# Patient Record
Sex: FEMALE | Race: WHITE | HISPANIC OR LATINO | Employment: FULL TIME | URBAN - METROPOLITAN AREA
[De-identification: names, ages, dates, MRNs, and addresses within clinical notes are randomized per-mention and may not be internally consistent; named-entity substitution may affect disease eponyms.]

---

## 2017-04-08 ENCOUNTER — GENERIC CONVERSION - ENCOUNTER (OUTPATIENT)
Dept: OTHER | Facility: OTHER | Age: 37
End: 2017-04-08

## 2017-11-24 ENCOUNTER — GENERIC CONVERSION - ENCOUNTER (OUTPATIENT)
Dept: OTHER | Facility: OTHER | Age: 37
End: 2017-11-24

## 2018-01-10 NOTE — MISCELLANEOUS
Message  GI Reminder Recall Dexter Treviño:   Date: 04/08/2017   Dear Sandeep Marcus:     Review of our records shows you are due for the following: EGD  Please call the following office to schedule your appointment:   [address] [facility], [facility] [address] [place] [address], [address] [place] [address]  [phone]  We look forward to hearing from you!      Sincerely,       Dr Sofi Leal   Electronically signed by : Farhad Nieves, ; Apr 8 2017  9:09AM EST                       (Author)

## 2018-01-13 NOTE — RESULT NOTES
Message   I discussed labs w/ pt, please mail her a copy     Verified Results  (1) CBC/PLT/DIFF 15Apr2016 10:35AM Maryjo Rodas     Test Name Result Flag Reference   WBC 5 2 x10E3/uL  3 4-10 8   RBC 4 03 x10E6/uL  3 77-5 28   Elliptocytes present  Few schistocytes  Hemoglobin 6 3 g/dL LL 11 1-15 9   Hematocrit 23 3 % L 34 0-46  6   MCV 58 fL L 79-97   MCH 15 6 pg L 26 6-33 0   MCHC 27 0 g/dL L 31 5-35 7   RDW 20 7 % H 12 3-15 4   Platelets 794 N22L3/ZH H 150-379   Neutrophils 58 %     Lymphs 29 %     Monocytes 10 %     Eos 2 %     Basos 1 %     Neutrophils (Absolute) 3 0 x10E3/uL  1 4-7 0   Lymphs (Absolute) 1 5 x10E3/uL  0 7-3 1   Monocytes(Absolute) 0 5 x10E3/uL  0 1-0 9   Eos (Absolute) 0 1 x10E3/uL  0 0-0 4   Baso (Absolute) 0 1 x10E3/uL  0 0-0 2   Immature Granulocytes 0 %     Immature Grans (Abs) 0 0 x10E3/uL  0 0-0 1   Hematology Comments: Note:     Verified by microscopic examination       (1) COMPREHENSIVE METABOLIC PANEL [id number] 35:80MU Maryjo Rodas     Test Name Result Flag Reference   Glucose, Serum 91 mg/dL  65-99   BUN 15 mg/dL  6-20   Creatinine, Serum 0 59 mg/dL  0 57-1 00   eGFR If NonAfricn Am 118 mL/min/1 73  >59   eGFR If Africn Am 136 mL/min/1 73  >59   BUN/Creatinine Ratio 25 H 8-20   Sodium, Serum 138 mmol/L  134-144   Potassium, Serum 4 1 mmol/L  3 5-5 2   Chloride, Serum 101 mmol/L     Carbon Dioxide, Total 21 mmol/L  18-29   Calcium, Serum 9 1 mg/dL  8 7-10 2   Protein, Total, Serum 7 6 g/dL  6 0-8 5   Albumin, Serum 4 2 g/dL  3 5-5 5   Globulin, Total 3 4 g/dL  1 5-4 5   A/G Ratio 1 2  1 1-2 5   Bilirubin, Total 0 2 mg/dL  0 0-1 2   Alkaline Phosphatase, S 79 IU/L     AST (SGOT) 14 IU/L  0-40   ALT (SGPT) 9 IU/L  0-32     (1) VITAMIN D 25-HYDROXY [id number] 10:35AM Maryjo Rodas     Test Name Result Flag Reference   Vitamin D, 25-Hydroxy 15 9 ng/mL L 30 0-100 0   Vitamin D deficiency has been defined by the [place] of  Medicine and an [place] 12 practice guideline as a  level of serum 25-OH vitamin D less than 20 ng/mL (1,2)  The Endocrine Society went on to further define vitamin D  insufficiency as a level between 21 and 29 ng/mL (2)  1  IOM ([place] of Medicine)  2010  Dietary reference     intakes for calcium and D  [facility]: The     [place]ian[place]in  2  Micheline MF, Jerad NC, Luis A HOLLINGSWORTH, et al      Evaluation, treatment, and prevention of vitamin D     deficiency: an Endocrine Society clinical practice     guideline  JCEM  2011 Jul; 96(7):1911-30  (1) HEMOGLOBIN A1C [id number] 10:35AM Maryjo Rodas     Test Name Result Flag Reference   Hemoglobin A1c 5 9 % H 4 8-5 6   Pre-diabetes: 5 7 - 6 4           Diabetes: >6 4           Glycemic control for adults with diabetes: <7 0     [facility]) Cardiovascular Risk Assessment 15Apr2016 10:35AM Maryjo Rodas     Test Name Result Flag Reference   Interpretation Note     Supplement report is available  PDF Image   (LC) LP 15Apr2016 10:35AM Maryjo Rodas     Test Name Result Flag Reference   Cholesterol, Total 149 mg/dL  100-199   Triglycerides 46 mg/dL  0-149   HDL Cholesterol 77 mg/dL  >39   According to ATP-III Guidelines, HDL-C >59 mg/dL is considered a  negative risk factor for CHD     VLDL Cholesterol Bassam 9 mg/dL  5-40   LDL Cholesterol Calc 63 mg/dL  0-99     (LC) Lyme Ab/Western Blot Reflex 15Apr2016 10:35AM Maryjo Rodas     Test Name Result Flag Reference   Lyme IgG/IgM Ab <0 91 ISR  0 00-0 90   Negative         <0 91                                                 Equivocal  0 91 - 1 09                                                 Positive         >1 09   Lyme Disease Ab, Quant, IgM <0 80 index  0 00-0 79   Negative         <0 80                                                 Equivocal  0 80 - 1 19                                                 Positive         >1 19                  IgM levels may peak at 3-6 weeks post infection, then gradually decline       (LC) TSH Rfx on Abnormal to Free T4 15Apr2016 10:35AM Maryjo Rodas     Test Name Result Flag Reference   TSH 1 270 uIU/mL  0 450-4 500

## 2018-01-14 NOTE — RESULT NOTES
Message    Gastric biopsies are negative for h pylori  Patient to continue protonix and carafate  1    EGD in 3 months  LMOM for pt to call the office for results/reminder set  Thanks CF1      Patient aware  lc    Please send message to patient; inform her she had fungal infection noted on her EGD and needs to take Diflucan for 2 weeks; I sent Rx to her pharmacy  Also remind her to follow up for EGD to confirm gastric ulcer healing  I've attempted calling her numerous times over the last couple of weeks but gotten no answer  2       1 Amended By: Wendy Brink; Apr 26 2016 8:30 AM EST   2 Amended By: Makeda Green; Apr 27 2016 9:01 AM EST    Verified Results  (1) FUNGUS - YEAST CULTURE1 19Apr2016 04:51PM1 Jane Martinez   In saline     Test Name Result Flag Reference   CLINICAL REPORT1 (Report)1     Test:        Fungal culture  Specimen Source:  Esophagus  Specimen Type:   Tissue  Specimen Date:   4/19/2016 4:51 PM  Result Date:    4/25/2016 8:37 AM  Result Status:   Final result  Resulting Lab:    [place] [address]            Tel: [phone]                 CULTURE                                       ------------------                                   3+ Growth of Candida albicans     *** This organism has been edited   The previous result was Yeast on 4/22/2016      at 1339 EDT  ***     (1) TISSUE EXAM 19Apr2016 04:48PM Jane Martinez     Test Name Result Flag Reference   LAB AP CASE REPORT (Report)     Surgical Pathology Report             Case: [id number]                   Authorizing Provider: Dipti Howe PA-C   Collected:      04/19/2016 1648        Ordering Location:   [facility]  Received:      04/19/2016 [address] [place] [address]                                     Pathologist:      Jessy Henderson MD                                Specimen:  Stomach, Gastric body, check H  pylori   LAB AP FINAL DIAGNOSIS (Report)     A  Stomach, Gastric body, check H  pylori, biopsy:   - Oxyntic gastric mucosa with mild chronic inactive gastritis  - Negative for curvilinear helicobacter organisms, confirmed by   immunostain with appropriate controls  - No glandular atrophy or intestinal metaplasia identified    - No glandular dysplasia and no evidence of malignancy  Interpretation performed at [facility], [address] [place] [address]  LAB AP SURGICAL ADDITIONAL INFORMATION (Report)     These tests were developed and their performance characteristics   determined by [address] [place] [address] or [facility]  They may not be cleared or approved by the [place]  Food and   Drug Administration  The FDA has determined that such clearance or   approval is not necessary  These tests are used for clinical purposes  They should not be regarded as investigational or for research  This   laboratory has been approved by CLIA 88, designated as a high-complexity   laboratory and is qualified to perform these tests  LAB AP GROSS DESCRIPTION (Report)     A  The specimen is received in formalin, labeled with the patient's name   and hospital number, and is designated gastric body, check H  pylori  The specimen consists of 3 rubbery and friable tan tissue fragments   measuring from 0 1 up to 0 5 cm in greatest dimension  Entirely submitted  One cassette  Note: The estimated total formalin fixation time based upon information   provided by the submitting clinician and the standard processing schedule   is 21 25 hours  SRS         1  Amended By: Zac Cantu;  May 11 2016 10:43 AM EST

## 2018-01-16 NOTE — MISCELLANEOUS
Message  GI Reminder Recall [facility]:   Date: 11/24/2017   Dear Dustin Alanis:     Review of our records shows you are due for the following: EGD  Or records indicate that you are due at this time to have a follow-up examination for a EGD  As you know, these tests are done to prevent cancer, a very common disease in the [place] and responsible for thousands of patient deaths each year  We at [facility] are concerned for your health, and would very much appreciate you getting in touch with us at your earliest convenience  Again, this examination is vital to your proper health maintenance and for the prevention of cancer  Please call the following office to schedule your appointment:   [address], [address] [phone]  We look forward to hearing from you!      Sincerely,     [facility]      Signatures   Electronically signed by : Agustina Jimenez, ; Nov 24 2017  2:47PM EST                       (Author)

## 2018-02-20 ENCOUNTER — HOSPITAL ENCOUNTER (EMERGENCY)
Facility: HOSPITAL | Age: 38
Discharge: HOME | End: 2018-02-20
Attending: EMERGENCY MEDICINE | Admitting: EMERGENCY MEDICINE
Payer: COMMERCIAL

## 2018-02-20 VITALS
WEIGHT: 153 LBS | HEART RATE: 112 BPM | HEIGHT: 64 IN | SYSTOLIC BLOOD PRESSURE: 108 MMHG | TEMPERATURE: 99.1 F | DIASTOLIC BLOOD PRESSURE: 65 MMHG | RESPIRATION RATE: 20 BRPM | BODY MASS INDEX: 26.12 KG/M2 | OXYGEN SATURATION: 98 %

## 2018-02-20 DIAGNOSIS — J11.1 FLU: Primary | ICD-10-CM

## 2018-02-20 PROCEDURE — 99284 EMERGENCY DEPT VISIT MOD MDM: CPT

## 2018-02-20 PROCEDURE — 87798 DETECT AGENT NOS DNA AMP: CPT | Performed by: EMERGENCY MEDICINE

## 2018-02-20 RX ORDER — GUAIFENESIN/DEXTROMETHORPHAN 100-10MG/5
10 SYRUP ORAL 4 TIMES DAILY PRN
Qty: 118 ML | Refills: 0 | Status: SHIPPED | OUTPATIENT
Start: 2018-02-20 | End: 2019-01-19

## 2018-02-20 RX ORDER — GUAIFENESIN/DEXTROMETHORPHAN 100-10MG/5
10 SYRUP ORAL ONCE
Status: COMPLETED | OUTPATIENT
Start: 2018-02-20 | End: 2018-02-20

## 2018-02-20 RX ORDER — OSELTAMIVIR PHOSPHATE 75 MG/1
75 CAPSULE ORAL ONCE
Status: COMPLETED | OUTPATIENT
Start: 2018-02-20 | End: 2018-02-20

## 2018-02-20 RX ORDER — OSELTAMIVIR PHOSPHATE 75 MG/1
75 CAPSULE ORAL 2 TIMES DAILY
Qty: 10 CAPSULE | Refills: 0 | Status: SHIPPED | OUTPATIENT
Start: 2018-02-20 | End: 2018-02-25

## 2018-02-20 RX ADMIN — OSELTAMIVIR PHOSPHATE 75 MG: 75 CAPSULE ORAL at 21:23

## 2018-02-20 RX ADMIN — GUAIFENESIN AND DEXTROMETHORPHAN 10 ML: 100; 10 SYRUP ORAL at 21:22

## 2018-02-21 LAB
FLUAV AG SPEC QL: DETECTED
FLUBV AG SPEC QL: ABNORMAL
RSV B RNA SPEC QL NAA+PROBE: ABNORMAL

## 2018-02-21 NOTE — DISCHARGE INSTRUCTIONS
Influenza   WHAT YOU NEED TO KNOW:   Influenza (the flu) is an infection caused by the influenza virus  The flu is easily spread when an infected person coughs, sneezes, or has close contact with others  You may be able to spread the flu to others for 1 week or longer after signs or symptoms appear  DISCHARGE INSTRUCTIONS:   Call 911 for any of the following:   · You have trouble breathing, and your lips look purple or blue  · You have a seizure  Return to the emergency department if:   · You are dizzy, or you are urinating less or not at all  · You have a headache with a stiff neck, and you feel tired or confused  · You have new pain or pressure in your chest     · Your symptoms, such as shortness of breath, vomiting, or diarrhea, get worse  · Your symptoms, such as fever and coughing, seem to get better, but then get worse  Contact your healthcare provider if:   · You have new muscle pain or weakness  · You have questions or concerns about your condition or care  Medicines: You may need any of the following:  · Acetaminophen  decreases pain and fever  It is available without a doctor's order  Ask how much to take and how often to take it  Follow directions  Acetaminophen can cause liver damage if not taken correctly  · NSAIDs , such as ibuprofen, help decrease swelling, pain, and fever  This medicine is available with or without a doctor's order  NSAIDs can cause stomach bleeding or kidney problems in certain people  If you take blood thinner medicine, always ask your healthcare provider if NSAIDs are safe for you  Always read the medicine label and follow directions  · Antivirals  help fight a viral infection  · Take your medicine as directed  Contact your healthcare provider if you think your medicine is not helping or if you have side effects  Tell him or her if you are allergic to any medicine  Keep a list of the medicines, vitamins, and herbs you take   Include the amounts, and when and why you take them  Bring the list or the pill bottles to follow-up visits  Carry your medicine list with you in case of an emergency  Rest  as much as you can to help you recover  Drink liquids as directed  to help prevent dehydration  Ask how much liquid to drink each day and which liquids are best for you  Prevent the spread of influenza:   · Wash your hands often  Use soap and water  Wash your hands after you use the bathroom, change a child's diapers, or sneeze  Wash your hands before you prepare or eat food  Use gel hand cleanser when soap and water are not available  Do not touch your eyes, nose, or mouth unless you have washed your hands first            · Cover your mouth when you sneeze or cough  Cough into a tissue or the bend of your arm  · Clean shared items with a germ-killing   Clean table surfaces, doorknobs, and light switches  Do not share towels, silverware, and dishes with people who are sick  Wash bed sheets, towels, silverware, and dishes with soap and water  · Wear a mask  over your mouth and nose if you are sick or are near anyone who is sick  · Stay away from others  if you are sick  · Influenza vaccine  helps prevent influenza (flu)  Everyone older than 6 months should get a yearly influenza vaccine  Get the vaccine as soon as it is available, usually in September or October each year  Follow up with your healthcare provider as directed:  Write down your questions so you remember to ask them during your visits  © 2017 [facility] Information is for End User's use only and may not be sold, redistributed or otherwise used for commercial purposes  All illustrations and images included in CareNotes® are the copyrighted property of [employer]  or Filemon Murdock  The above information is an  only  It is not intended as medical advice for individual conditions or treatments   Talk to your doctor, nurse or pharmacist before following any medical regimen to see if it is safe and effective for you

## 2018-06-23 ENCOUNTER — APPOINTMENT (OUTPATIENT)
Dept: LAB | Facility: HOSPITAL | Age: 38
End: 2018-06-23
Payer: COMMERCIAL

## 2018-06-23 ENCOUNTER — TRANSCRIBE ORDERS (OUTPATIENT)
Dept: ADMINISTRATIVE | Facility: HOSPITAL | Age: 38
End: 2018-06-23

## 2018-06-23 DIAGNOSIS — Z00.8 HEALTH EXAMINATION IN POPULATION SURVEY: ICD-10-CM

## 2018-06-23 DIAGNOSIS — Z00.8 HEALTH EXAMINATION IN POPULATION SURVEY: Primary | ICD-10-CM

## 2018-06-23 LAB
CHOLEST SERPL-MCNC: 167 MG/DL (ref 50–200)
EST. AVERAGE GLUCOSE BLD GHB EST-MCNC: 120 MG/DL
HBA1C MFR BLD: 5.8 % (ref 4.2–6.3)
HDLC SERPL-MCNC: 96 MG/DL (ref 40–60)
LDLC SERPL CALC-MCNC: 62 MG/DL (ref 0–100)
NONHDLC SERPL-MCNC: 71 MG/DL
TRIGL SERPL-MCNC: 45 MG/DL

## 2018-06-23 PROCEDURE — 80061 LIPID PANEL: CPT

## 2018-06-23 PROCEDURE — 83036 HEMOGLOBIN GLYCOSYLATED A1C: CPT

## 2018-06-23 PROCEDURE — 36415 COLL VENOUS BLD VENIPUNCTURE: CPT

## 2018-07-11 ENCOUNTER — APPOINTMENT (OUTPATIENT)
Dept: LAB | Facility: CLINIC | Age: 38
End: 2018-07-11
Payer: COMMERCIAL

## 2018-07-11 ENCOUNTER — OFFICE VISIT (OUTPATIENT)
Dept: FAMILY MEDICINE CLINIC | Facility: CLINIC | Age: 38
End: 2018-07-11
Payer: COMMERCIAL

## 2018-07-11 VITALS
WEIGHT: 143 LBS | RESPIRATION RATE: 16 BRPM | SYSTOLIC BLOOD PRESSURE: 110 MMHG | DIASTOLIC BLOOD PRESSURE: 82 MMHG | HEIGHT: 64 IN | BODY MASS INDEX: 24.41 KG/M2 | HEART RATE: 72 BPM | TEMPERATURE: 97.5 F

## 2018-07-11 DIAGNOSIS — D64.9 ANEMIA, UNSPECIFIED TYPE: ICD-10-CM

## 2018-07-11 DIAGNOSIS — K28.9 ULCER AT SITE OF SURGICAL ANASTOMOSIS FOLLOWING BYPASS OF STOMACH: ICD-10-CM

## 2018-07-11 DIAGNOSIS — T85.898A ULCER AT SITE OF SURGICAL ANASTOMOSIS FOLLOWING BYPASS OF STOMACH: ICD-10-CM

## 2018-07-11 DIAGNOSIS — Z98.84 HISTORY OF GASTRIC BYPASS: ICD-10-CM

## 2018-07-11 DIAGNOSIS — K27.9 PUD (PEPTIC ULCER DISEASE): ICD-10-CM

## 2018-07-11 DIAGNOSIS — R11.2 NAUSEA AND VOMITING, INTRACTABILITY OF VOMITING NOT SPECIFIED, UNSPECIFIED VOMITING TYPE: Primary | ICD-10-CM

## 2018-07-11 DIAGNOSIS — R11.2 NAUSEA AND VOMITING, INTRACTABILITY OF VOMITING NOT SPECIFIED, UNSPECIFIED VOMITING TYPE: ICD-10-CM

## 2018-07-11 LAB
ALBUMIN SERPL BCP-MCNC: 3.7 G/DL (ref 3.5–5)
ALP SERPL-CCNC: 83 U/L (ref 46–116)
ALT SERPL W P-5'-P-CCNC: 20 U/L (ref 12–78)
AMYLASE SERPL-CCNC: 37 IU/L (ref 25–115)
ANION GAP SERPL CALCULATED.3IONS-SCNC: 7 MMOL/L (ref 4–13)
AST SERPL W P-5'-P-CCNC: 17 U/L (ref 5–45)
BASOPHILS # BLD AUTO: 0.07 THOUSANDS/ΜL (ref 0–0.1)
BASOPHILS NFR BLD AUTO: 1 % (ref 0–1)
BILIRUB SERPL-MCNC: 0.4 MG/DL (ref 0.2–1)
BUN SERPL-MCNC: 20 MG/DL (ref 5–25)
CALCIUM SERPL-MCNC: 9.1 MG/DL (ref 8.3–10.1)
CHLORIDE SERPL-SCNC: 99 MMOL/L (ref 100–108)
CO2 SERPL-SCNC: 29 MMOL/L (ref 21–32)
CREAT SERPL-MCNC: 0.71 MG/DL (ref 0.6–1.3)
EOSINOPHIL # BLD AUTO: 0.1 THOUSAND/ΜL (ref 0–0.61)
EOSINOPHIL NFR BLD AUTO: 2 % (ref 0–6)
ERYTHROCYTE [DISTWIDTH] IN BLOOD BY AUTOMATED COUNT: 19.9 % (ref 11.6–15.1)
FERRITIN SERPL-MCNC: 5 NG/ML (ref 8–388)
FOLATE SERPL-MCNC: 18.2 NG/ML (ref 3.1–17.5)
GFR SERPL CREATININE-BSD FRML MDRD: 108 ML/MIN/1.73SQ M
GLUCOSE SERPL-MCNC: 97 MG/DL (ref 65–140)
HCT VFR BLD AUTO: 29.4 % (ref 34.8–46.1)
HGB BLD-MCNC: 8.6 G/DL (ref 11.5–15.4)
IRON SATN MFR SERPL: 5 %
IRON SERPL-MCNC: 24 UG/DL (ref 50–170)
LIPASE SERPL-CCNC: 86 U/L (ref 73–393)
LYMPHOCYTES # BLD AUTO: 1.42 THOUSANDS/ΜL (ref 0.6–4.47)
LYMPHOCYTES NFR BLD AUTO: 25 % (ref 14–44)
MCH RBC QN AUTO: 20.9 PG (ref 26.8–34.3)
MCHC RBC AUTO-ENTMCNC: 29.3 G/DL (ref 31.4–37.4)
MCV RBC AUTO: 72 FL (ref 82–98)
MONOCYTES # BLD AUTO: 0.61 THOUSAND/ΜL (ref 0.17–1.22)
MONOCYTES NFR BLD AUTO: 11 % (ref 4–12)
NEUTROPHILS # BLD AUTO: 3.46 THOUSANDS/ΜL (ref 1.85–7.62)
NEUTS SEG NFR BLD AUTO: 61 % (ref 43–75)
PLATELET # BLD AUTO: 357 THOUSANDS/UL (ref 149–390)
POTASSIUM SERPL-SCNC: 3.5 MMOL/L (ref 3.5–5.3)
PROT SERPL-MCNC: 7.9 G/DL (ref 6.4–8.2)
RBC # BLD AUTO: 4.11 MILLION/UL (ref 3.81–5.12)
SODIUM SERPL-SCNC: 135 MMOL/L (ref 136–145)
TIBC SERPL-MCNC: 450 UG/DL (ref 250–450)
VIT B12 SERPL-MCNC: 467 PG/ML (ref 100–900)
WBC # BLD AUTO: 5.66 THOUSAND/UL (ref 4.31–10.16)

## 2018-07-11 PROCEDURE — 83550 IRON BINDING TEST: CPT

## 2018-07-11 PROCEDURE — 82746 ASSAY OF FOLIC ACID SERUM: CPT

## 2018-07-11 PROCEDURE — 82728 ASSAY OF FERRITIN: CPT

## 2018-07-11 PROCEDURE — 36415 COLL VENOUS BLD VENIPUNCTURE: CPT

## 2018-07-11 PROCEDURE — 83540 ASSAY OF IRON: CPT

## 2018-07-11 PROCEDURE — 82607 VITAMIN B-12: CPT

## 2018-07-11 PROCEDURE — 80053 COMPREHEN METABOLIC PANEL: CPT

## 2018-07-11 PROCEDURE — 99214 OFFICE O/P EST MOD 30 MIN: CPT | Performed by: NURSE PRACTITIONER

## 2018-07-11 PROCEDURE — 82150 ASSAY OF AMYLASE: CPT

## 2018-07-11 PROCEDURE — 83690 ASSAY OF LIPASE: CPT

## 2018-07-11 PROCEDURE — 3008F BODY MASS INDEX DOCD: CPT | Performed by: NURSE PRACTITIONER

## 2018-07-11 PROCEDURE — 85025 COMPLETE CBC W/AUTO DIFF WBC: CPT

## 2018-07-11 PROCEDURE — 1036F TOBACCO NON-USER: CPT | Performed by: NURSE PRACTITIONER

## 2018-07-11 RX ORDER — OMEPRAZOLE 20 MG/1
20 CAPSULE, DELAYED RELEASE ORAL DAILY PRN
COMMUNITY
End: 2019-06-26

## 2018-07-11 RX ORDER — ONDANSETRON 4 MG/1
4 TABLET, FILM COATED ORAL EVERY 8 HOURS PRN
Qty: 20 TABLET | Refills: 0 | Status: SHIPPED | OUTPATIENT
Start: 2018-07-11 | End: 2019-01-19

## 2018-07-11 RX ORDER — PANTOPRAZOLE SODIUM 40 MG/1
40 TABLET, DELAYED RELEASE ORAL DAILY
Qty: 30 TABLET | Refills: 0 | Status: SHIPPED | OUTPATIENT
Start: 2018-07-11 | End: 2019-01-19

## 2018-07-11 RX ORDER — IBUPROFEN 400 MG/1
TABLET ORAL AS NEEDED
COMMUNITY
End: 2019-01-19

## 2018-07-11 NOTE — PATIENT INSTRUCTIONS
Supportive care discussed and advised  Follow up for no improvement and worsening of conditions  Patient advised and educated when to see immediate medical care

## 2018-07-11 NOTE — PROGRESS NOTES
Assessment/Plan:  Will start protonix and zofran and will follow up with GI and if getting worse, will go to ER  Supportive care discussed and advised  Follow up for no improvement and worsening of conditions  Patient advised and educated when to see immediate medical care  Diagnoses and all orders for this visit:    Nausea and vomiting, intractability of vomiting not specified, unspecified vomiting type  -     Comprehensive metabolic panel; Future  -     CBC and differential; Future  -     Iron Panel; Future  -     Ferritin; Future  -     Amylase; Future  -     Lipase; Future  -     Ambulatory referral to Gastroenterology; Future  -     Vitamin B12; Future  -     Folate; Future  -     pantoprazole (PROTONIX) 40 mg tablet; Take 1 tablet (40 mg total) by mouth daily for 30 days  -     ondansetron (ZOFRAN) 4 mg tablet; Take 1 tablet (4 mg total) by mouth every 8 (eight) hours as needed for nausea or vomiting    PUD (peptic ulcer disease)  -     Comprehensive metabolic panel; Future  -     CBC and differential; Future  -     Iron Panel; Future  -     Ferritin; Future  -     Amylase; Future  -     Lipase; Future  -     Ambulatory referral to Gastroenterology; Future  -     Vitamin B12; Future  -     Folate; Future  -     pantoprazole (PROTONIX) 40 mg tablet; Take 1 tablet (40 mg total) by mouth daily for 30 days    Ulcer at site of surgical anastomosis following bypass of stomach  -     Comprehensive metabolic panel; Future  -     CBC and differential; Future  -     Iron Panel; Future  -     Ferritin; Future  -     Amylase; Future  -     Lipase; Future  -     Ambulatory referral to Gastroenterology; Future  -     Vitamin B12; Future  -     Folate; Future  -     pantoprazole (PROTONIX) 40 mg tablet; Take 1 tablet (40 mg total) by mouth daily for 30 days    Anemia, unspecified type  -     Comprehensive metabolic panel; Future  -     CBC and differential; Future  -     Iron Panel;  Future  -     Ferritin; Future  -     Amylase; Future  -     Lipase; Future  -     Vitamin B12; Future  -     Folate; Future    History of gastric bypass    BMI 24 0-24 9, adult    Other orders  -     omeprazole (PriLOSEC) 20 mg delayed release capsule; Take 20 mg by mouth daily as needed  -     ibuprofen (MOTRIN) 400 mg tablet; Take by mouth as needed for mild pain          Return if symptoms worsen or fail to improve  Subjective:      Patient ID: Lizz Reed is a 44 y o  female  Chief Complaint   Patient presents with    GI Problem     prcma       HPI   Had gastric bypass in 2004 and gets on and off dumping syndrome  Not able to keep anything down and vomits 5 mts after she eats and stated that did not taste any stomach acid from a week  Stated that able to manage her dumping syndrome on her own  Stated that never follow up with physicians as advised  Had PUD and had scope done and never went back for repeat one  Currently taking omperazole 20 mg daily  Denies chest pain, sob, headache and dizziness  Drinks alcohol very occasionally  The following portions of the patient's history were reviewed and updated as appropriate: allergies, current medications, past family history, past medical history, past social history, past surgical history and problem list       Review of Systems   Constitutional: Negative  Respiratory: Negative  Cardiovascular: Negative  Gastrointestinal: Positive for nausea and vomiting  Negative for abdominal distention, abdominal pain, anal bleeding, blood in stool, constipation, diarrhea and rectal pain  Genitourinary: Negative  Musculoskeletal: Negative  Neurological: Negative  Objective:    History   Smoking Status    Never Smoker   Smokeless Tobacco    Never Used       Allergies:    Allergies   Allergen Reactions    Penicillins     Percocet [Oxycodone-Acetaminophen]        Vitals:  /82   Pulse 72   Temp 97 5 °F (36 4 °C)   Resp 16   Ht 5' 4" (1 626 m) Wt 64 9 kg (143 lb)   LMP 06/27/2018 (Approximate)   BMI 24 55 kg/m²     Current Outpatient Prescriptions   Medication Sig Dispense Refill    acetaminophen (TYLENOL) 325 mg tablet Take 2 tablets (650 mg total) by mouth every 6 (six) hours as needed for mild pain  30 tablet 0    ibuprofen (MOTRIN) 400 mg tablet Take by mouth as needed for mild pain      omeprazole (PriLOSEC) 20 mg delayed release capsule Take 20 mg by mouth daily as needed      dextromethorphan-guaiFENesin (ROBITUSSIN DM)  mg/5 mL syrup Take 10 mL by mouth 4 (four) times a day as needed for cough 118 mL 0    ondansetron (ZOFRAN) 4 mg tablet Take 1 tablet (4 mg total) by mouth every 8 (eight) hours as needed for nausea or vomiting 20 tablet 0    pantoprazole (PROTONIX) 40 mg tablet Take 1 tablet (40 mg total) by mouth daily for 30 days 30 tablet 0     No current facility-administered medications for this visit  Physical Exam   Constitutional: She is oriented to person, place, and time  She appears well-developed and well-nourished  HENT:   Head: Normocephalic  Right Ear: External ear normal    Left Ear: External ear normal    Nose: Nose normal    Mouth/Throat: Oropharynx is clear and moist    Eyes: Conjunctivae are normal  Pupils are equal, round, and reactive to light  Cardiovascular: Normal rate, regular rhythm and normal heart sounds  Pulmonary/Chest: Effort normal and breath sounds normal    Abdominal: Soft  Normal appearance and bowel sounds are normal  There is no hepatosplenomegaly  There is no tenderness  There is no rebound and no CVA tenderness  Musculoskeletal: Normal range of motion  She exhibits no edema, tenderness or deformity  Neurological: She is alert and oriented to person, place, and time  She has normal strength  No sensory deficit  She displays a negative Romberg sign  GCS eye subscore is 4  GCS verbal subscore is 5  GCS motor subscore is 6  Skin: Skin is warm and dry     Psychiatric: She has a normal mood and affect   Her behavior is normal  Judgment and thought content normal          DNEYS Rabago

## 2019-01-19 ENCOUNTER — APPOINTMENT (EMERGENCY)
Dept: RADIOLOGY | Facility: HOSPITAL | Age: 39
End: 2019-01-19
Payer: COMMERCIAL

## 2019-01-19 ENCOUNTER — HOSPITAL ENCOUNTER (EMERGENCY)
Facility: HOSPITAL | Age: 39
Discharge: HOME | End: 2019-01-19
Attending: EMERGENCY MEDICINE | Admitting: EMERGENCY MEDICINE
Payer: COMMERCIAL

## 2019-01-19 VITALS
TEMPERATURE: 99.1 F | SYSTOLIC BLOOD PRESSURE: 104 MMHG | DIASTOLIC BLOOD PRESSURE: 57 MMHG | HEART RATE: 92 BPM | OXYGEN SATURATION: 98 % | RESPIRATION RATE: 18 BRPM

## 2019-01-19 DIAGNOSIS — D64.9 SYMPTOMATIC ANEMIA: Primary | ICD-10-CM

## 2019-01-19 LAB
ABO GROUP BLD: NORMAL
ALBUMIN SERPL BCP-MCNC: 3.4 G/DL (ref 3.5–5)
ALP SERPL-CCNC: 55 U/L (ref 46–116)
ALT SERPL W P-5'-P-CCNC: 18 U/L (ref 12–78)
ANION GAP SERPL CALCULATED.3IONS-SCNC: 9 MMOL/L (ref 4–13)
AST SERPL W P-5'-P-CCNC: 65 U/L (ref 5–45)
BASOPHILS # BLD AUTO: 0.05 THOUSANDS/ΜL (ref 0–0.1)
BASOPHILS NFR BLD AUTO: 1 % (ref 0–1)
BILIRUB SERPL-MCNC: 0.3 MG/DL (ref 0.2–1)
BILIRUB UR QL STRIP: NEGATIVE
BLD GP AB SCN SERPL QL: NEGATIVE
BUN SERPL-MCNC: 19 MG/DL (ref 5–25)
CALCIUM SERPL-MCNC: 8.9 MG/DL (ref 8.3–10.1)
CHLORIDE SERPL-SCNC: 99 MMOL/L (ref 100–108)
CLARITY UR: CLEAR
CO2 SERPL-SCNC: 27 MMOL/L (ref 21–32)
COLOR UR: YELLOW
CREAT SERPL-MCNC: 0.6 MG/DL (ref 0.6–1.3)
EOSINOPHIL # BLD AUTO: 0.15 THOUSAND/ΜL (ref 0–0.61)
EOSINOPHIL NFR BLD AUTO: 2 % (ref 0–6)
ERYTHROCYTE [DISTWIDTH] IN BLOOD BY AUTOMATED COUNT: 18.7 % (ref 11.6–15.1)
EXT PREG TEST URINE: NEGATIVE
GFR SERPL CREATININE-BSD FRML MDRD: 115 ML/MIN/1.73SQ M
GLUCOSE SERPL-MCNC: 79 MG/DL (ref 65–140)
GLUCOSE UR STRIP-MCNC: NEGATIVE MG/DL
HCT VFR BLD AUTO: 25 % (ref 34.8–46.1)
HGB BLD-MCNC: 7 G/DL (ref 11.5–15.4)
HGB UR QL STRIP.AUTO: NEGATIVE
IMM GRANULOCYTES # BLD AUTO: 0.02 THOUSAND/UL (ref 0–0.2)
IMM GRANULOCYTES NFR BLD AUTO: 0 % (ref 0–2)
KETONES UR STRIP-MCNC: NEGATIVE MG/DL
LEUKOCYTE ESTERASE UR QL STRIP: NEGATIVE
LYMPHOCYTES # BLD AUTO: 2.19 THOUSANDS/ΜL (ref 0.6–4.47)
LYMPHOCYTES NFR BLD AUTO: 34 % (ref 14–44)
MCH RBC QN AUTO: 19.6 PG (ref 26.8–34.3)
MCHC RBC AUTO-ENTMCNC: 28 G/DL (ref 31.4–37.4)
MCV RBC AUTO: 70 FL (ref 82–98)
MONOCYTES # BLD AUTO: 0.58 THOUSAND/ΜL (ref 0.17–1.22)
MONOCYTES NFR BLD AUTO: 9 % (ref 4–12)
NEUTROPHILS # BLD AUTO: 3.4 THOUSANDS/ΜL (ref 1.85–7.62)
NEUTS SEG NFR BLD AUTO: 54 % (ref 43–75)
NITRITE UR QL STRIP: NEGATIVE
NRBC BLD AUTO-RTO: 0 /100 WBCS
PH UR STRIP.AUTO: 6 [PH] (ref 5–9)
PLATELET # BLD AUTO: 379 THOUSANDS/UL (ref 149–390)
POTASSIUM SERPL-SCNC: 4.2 MMOL/L (ref 3.5–5.3)
PROT SERPL-MCNC: 7.5 G/DL (ref 6.4–8.2)
PROT UR STRIP-MCNC: NEGATIVE MG/DL
RBC # BLD AUTO: 3.57 MILLION/UL (ref 3.81–5.12)
RH BLD: POSITIVE
SODIUM SERPL-SCNC: 135 MMOL/L (ref 136–145)
SP GR UR STRIP.AUTO: 1.02 (ref 1–1.03)
SPECIMEN EXPIRATION DATE: NORMAL
TROPONIN I SERPL-MCNC: <0.02 NG/ML
TSH SERPL DL<=0.05 MIU/L-ACNC: 1.19 UIU/ML (ref 0.36–3.74)
UROBILINOGEN UR QL STRIP.AUTO: 0.2 E.U./DL
WBC # BLD AUTO: 6.39 THOUSAND/UL (ref 4.31–10.16)

## 2019-01-19 PROCEDURE — 71045 X-RAY EXAM CHEST 1 VIEW: CPT

## 2019-01-19 PROCEDURE — 93005 ELECTROCARDIOGRAM TRACING: CPT

## 2019-01-19 PROCEDURE — 80053 COMPREHEN METABOLIC PANEL: CPT | Performed by: EMERGENCY MEDICINE

## 2019-01-19 PROCEDURE — 99284 EMERGENCY DEPT VISIT MOD MDM: CPT

## 2019-01-19 PROCEDURE — 36430 TRANSFUSION BLD/BLD COMPNT: CPT

## 2019-01-19 PROCEDURE — 84443 ASSAY THYROID STIM HORMONE: CPT | Performed by: EMERGENCY MEDICINE

## 2019-01-19 PROCEDURE — 96360 HYDRATION IV INFUSION INIT: CPT

## 2019-01-19 PROCEDURE — 86901 BLOOD TYPING SEROLOGIC RH(D): CPT | Performed by: EMERGENCY MEDICINE

## 2019-01-19 PROCEDURE — P9016 RBC LEUKOCYTES REDUCED: HCPCS

## 2019-01-19 PROCEDURE — 84484 ASSAY OF TROPONIN QUANT: CPT | Performed by: EMERGENCY MEDICINE

## 2019-01-19 PROCEDURE — 36415 COLL VENOUS BLD VENIPUNCTURE: CPT | Performed by: EMERGENCY MEDICINE

## 2019-01-19 PROCEDURE — 81003 URINALYSIS AUTO W/O SCOPE: CPT | Performed by: EMERGENCY MEDICINE

## 2019-01-19 PROCEDURE — 86900 BLOOD TYPING SEROLOGIC ABO: CPT | Performed by: EMERGENCY MEDICINE

## 2019-01-19 PROCEDURE — 86850 RBC ANTIBODY SCREEN: CPT | Performed by: EMERGENCY MEDICINE

## 2019-01-19 PROCEDURE — 81025 URINE PREGNANCY TEST: CPT | Performed by: EMERGENCY MEDICINE

## 2019-01-19 PROCEDURE — 85025 COMPLETE CBC W/AUTO DIFF WBC: CPT | Performed by: EMERGENCY MEDICINE

## 2019-01-19 PROCEDURE — P9021 RED BLOOD CELLS UNIT: HCPCS

## 2019-01-19 PROCEDURE — 86920 COMPATIBILITY TEST SPIN: CPT

## 2019-01-19 RX ADMIN — SODIUM CHLORIDE 1000 ML: 0.9 INJECTION, SOLUTION INTRAVENOUS at 04:55

## 2019-01-19 NOTE — DISCHARGE INSTRUCTIONS
Anemia   WHAT YOU NEED TO KNOW:   Anemia is a low number of red blood cells or a low amount of hemoglobin in your red blood cells  Hemoglobin is a protein that helps carry oxygen throughout your body  Red blood cells use iron to create hemoglobin  Anemia may develop if your body does not have enough iron  It may also develop if your body does not make enough red blood cells or they die faster than your body can make them  DISCHARGE INSTRUCTIONS:   Call 911 or have someone call 911 for any of the following:   · You lose consciousness  · You have severe chest pain  Return to the emergency department if:   · You have dark or bloody bowel movements  Contact your healthcare provider if:   · Your symptoms are worse, even after treatment  · You have questions or concerns about your condition or care  Medicines:   · Iron or folic acid supplements  help increase your red blood cell and hemoglobin levels  · Vitamin B12 injections  may help boost your red blood cell level and decrease your symptoms  Ask your healthcare provider how to inject B12  · Take your medicine as directed  Contact your healthcare provider if you think your medicine is not helping or if you have side effects  Tell him of her if you are allergic to any medicine  Keep a list of the medicines, vitamins, and herbs you take  Include the amounts, and when and why you take them  Bring the list or the pill bottles to follow-up visits  Carry your medicine list with you in case of an emergency  Prevent anemia:  Eat healthy foods rich in iron and vitamin C  Nuts, meat, dark leafy green vegetables, and beans are high in iron and protein  Vitamin C helps your body absorb iron  Foods rich in vitamin C include oranges and other citrus fruits  Ask your healthcare provider for a list of other foods that are high in iron or vitamin C  Ask if you need to be on a special diet     Follow up with your healthcare provider as directed:  Write down your questions so you remember to ask them during your visits  © 2017 [address] Information is for End User's use only and may not be sold, redistributed or otherwise used for commercial purposes  All illustrations and images included in CareNotes® are the copyrighted property of A D A M , Inc  or Filemon Murdock  The above information is an  only  It is not intended as medical advice for individual conditions or treatments  Talk to your doctor, nurse or pharmacist before following any medical regimen to see if it is safe and effective for you

## 2019-01-19 NOTE — ED NOTES
Patient given breakfast tray  Resting quietly  Offers no complaints       Everett Hobbs, DANIELLE  01/19/19 5837

## 2019-01-19 NOTE — ED NOTES
Patient is resting comfortably  Offers no complaints  No distress noted       Brigid Rowan RN  01/19/19 7000

## 2019-01-19 NOTE — ED PROVIDER NOTES
History  Chief Complaint   Patient presents with    Fatigue     pt has been feeling generalized weakness and fatigue for the past couple of weeks  Presents pale and has a history of bleeding ulcers, anemia and transfusions  Pt had a near syncopal episode yesterday     79-year-old  female with past medical history of anemia, GI bleeding secondary to bleeding ulcer  Patient states she has been feeling generally weak for the last couple weeks  No fever, no tick bite, no rash, no sore throat, no hematuria, no melena  History provided by:  Patient  Fatigue   Severity:  Moderate  Onset quality:  Gradual  Duration:  2 weeks  Timing:  Constant  Progression:  Unchanged  Chronicity:  Recurrent  Context: not allergies, not change in medication and not drug use    Relieved by:  Nothing  Worsened by: Activity  Ineffective treatments:  Rest  Associated symptoms: no abdominal pain, no anorexia, no aphasia, no chest pain, no cough, no diarrhea, no dizziness, no headaches and no shortness of breath    Risk factors: anemia    Risk factors: no new medications        Prior to Admission Medications   Prescriptions Last Dose Informant Patient Reported? Taking?   acetaminophen (TYLENOL) 325 mg tablet   No Yes   Sig: Take 2 tablets (650 mg total) by mouth every 6 (six) hours as needed for mild pain  omeprazole (PriLOSEC) 20 mg delayed release capsule  Self Yes Yes   Sig: Take 20 mg by mouth daily as needed      Facility-Administered Medications: None       Past Medical History:   Diagnosis Date    Anemia     Genital herpes in women     Herpes simplex infection        Past Surgical History:   Procedure Laterality Date    ABDOMINAL SURGERY      CYST REMOVAL      ESOPHAGOGASTRODUODENOSCOPY N/A 4/19/2016    Procedure: ESOPHAGOGASTRODUODENOSCOPY (EGD); Surgeon: Kevin Miguel MD;  Location: [facility];   Service:     GASTRIC BYPASS      OOPHORECTOMY  2008    unilateral    OVARIAN CYST REMOVAL         Family History   Problem Relation Age of Onset    Cancer Mother     Diabetes Mother     Hypothyroidism Mother     Mental illness Mother     Cancer Father     Diabetes Father     Mental illness Father      I have reviewed and agree with the history as documented  Social History   Substance Use Topics    Smoking status: Never Smoker    Smokeless tobacco: Never Used    Alcohol use No      Comment: social use as per [employer]        Review of Systems   Constitutional: Positive for fatigue  Negative for chills  HENT: Negative  Respiratory: Negative for cough, shortness of breath, wheezing and stridor  Cardiovascular: Negative for chest pain  Gastrointestinal: Negative for abdominal pain, anorexia and diarrhea  Genitourinary: Negative  Musculoskeletal: Negative  Skin: Negative  Neurological: Positive for weakness  Negative for dizziness and headaches  Hematological: Negative  Psychiatric/Behavioral: Negative  All other systems reviewed and are negative  Physical Exam  Physical Exam   Constitutional: She is oriented to person, place, and time  She appears well-developed and well-nourished  HENT:   Head: Normocephalic and atraumatic  Right Ear: External ear normal    Left Ear: External ear normal    Nose: Nose normal    Mouth/Throat: Oropharynx is clear and moist    Eyes: Pupils are equal, round, and reactive to light  Conjunctivae and EOM are normal    Neck: Normal range of motion  Neck supple  Cardiovascular: Normal rate, regular rhythm, normal heart sounds and intact distal pulses  Pulmonary/Chest: Effort normal and breath sounds normal    Abdominal: Soft  Bowel sounds are normal    Musculoskeletal: Normal range of motion  Neurological: She is alert and oriented to person, place, and time  Skin: Skin is warm  Capillary refill takes less than 2 seconds  There is pallor  Psychiatric: She has a normal mood and affect   Her behavior is normal  Judgment and thought content normal    Nursing note and vitals reviewed  Vital Signs  ED Triage Vitals [01/19/19 0446]   Temperature Pulse Respirations Blood Pressure SpO2   99 2 °F (37 3 °C) 96 18 120/74 100 %      Temp Source Heart Rate Source Patient Position - Orthostatic VS BP Location FiO2 (%)   Tympanic Monitor Lying Right arm --      Pain Score       No Pain           Vitals:    01/19/19 0530 01/19/19 0545 01/19/19 0700 01/19/19 0702   BP:   106/58 106/58   Pulse: 86 76 74 88   Patient Position - Orthostatic VS:           Visual Acuity      ED Medications  Medications   sodium chloride 0 9 % bolus 1,000 mL (0 mL Intravenous Stopped 1/19/19 0600)       Diagnostic Studies  Results Reviewed     Procedure Component Value Units Date/Time    TSH [[id number]]  (Normal) Collected:  01/19/19 0452    Lab Status:  Final result Specimen:  Blood from Vein Updated:  01/19/19 0553     TSH 3RD GENERATON 1 186 uIU/mL     Narrative:         Patients undergoing fluorescein dye angiography may retain small amounts of fluorescein in the body for 48-72 hours post procedure  Samples containing fluorescein can produce falsely depressed TSH values  If the patient had this procedure,a specimen should be resubmitted post fluorescein clearance            The recommended reference ranges for TSH during pregnancy are as follows:  First trimester 0 1 to 2 5 uIU/mL  Second trimester  0 2 to 3 0 uIU/mL  Third trimester 0 3 to 3 0 uIU/m      Troponin I [[id number]]  (Normal) Collected:  01/19/19 0452    Lab Status:  Final result Specimen:  Blood from Vein Updated:  01/19/19 0519     Troponin I <0 02 ng/mL     Comprehensive metabolic panel [[id number]]  (Abnormal) Collected:  01/19/19 0452    Lab Status:  Final result Specimen:  Blood from Vein Updated:  01/19/19 0518     Sodium 135 (L) mmol/L      Potassium 4 2 mmol/L      Chloride 99 (L) mmol/L      CO2 27 mmol/L      ANION GAP 9 mmol/L      BUN 19 mg/dL      Creatinine 0 60 mg/dL      Glucose 79 mg/dL Calcium 8 9 mg/dL      AST 65 (H) U/L      ALT 18 U/L      Alkaline Phosphatase 55 U/L      Total Protein 7 5 g/dL      Albumin 3 4 (L) g/dL      Total Bilirubin 0 30 mg/dL      eGFR 115 ml/min/1 73sq m     Narrative:         National Kidney Disease Education Program recommendations are as follows:  GFR calculation is accurate only with a steady state creatinine  Chronic Kidney disease less than 60 ml/min/1 73 sq  meters  Kidney failure less than 15 ml/min/1 73 sq  meters      Urinalysis with reflex to microscopic [[id number]] Collected:  01/19/19 0458    Lab Status:  Final result Specimen:  Urine from Urine, Clean Catch Updated:  01/19/19 0505     Color, UA Yellow     Clarity, UA Clear     Specific Gravity, UA 1 025     pH, UA 6 0     Leukocytes, UA Negative     Nitrite, UA Negative     Protein, UA Negative mg/dl      Glucose, UA Negative mg/dl      Ketones, UA Negative mg/dl      Urobilinogen, UA 0 2 E U /dl      Bilirubin, UA Negative     Blood, UA Negative    CBC and differential [[id number]]  (Abnormal) Collected:  01/19/19 0452    Lab Status:  Final result Specimen:  Blood from Vein Updated:  01/19/19 0459     WBC 6 39 Thousand/uL      RBC 3 57 (L) Million/uL      Hemoglobin 7 0 (L) g/dL      Hematocrit 25 0 (L) %      MCV 70 (L) fL      MCH 19 6 (L) pg      MCHC 28 0 (L) g/dL      RDW 18 7 (H) %      Platelets 225 Thousands/uL      nRBC 0 /100 WBCs      Neutrophils Relative 54 %      Immat GRANS % 0 %      Lymphocytes Relative 34 %      Monocytes Relative 9 %      Eosinophils Relative 2 %      Basophils Relative 1 %      Neutrophils Absolute 3 40 Thousands/µL      Immature Grans Absolute 0 02 Thousand/uL      Lymphocytes Absolute 2 19 Thousands/µL      Monocytes Absolute 0 58 Thousand/µL      Eosinophils Absolute 0 15 Thousand/µL      Basophils Absolute 0 05 Thousands/µL     POCT pregnancy, urine [[id number]]  (Normal) Resulted:  01/19/19 0458    Lab Status:  Final result Updated:  01/19/19 0458     EXT PREG TEST UR (Ref: Negative) negative                 XR chest 1 view portable    (Results Pending)              Procedures  ECG 12 Lead Documentation  Date/Time: 1/19/2019 4:37 AM  Performed by: Pervis Net  Authorized by: Pervis Net     Indications / Diagnosis:  Weakness fatigue  ECG reviewed by me, the ED Provider: yes    Patient location:  ED  Previous ECG:     Comparison to cardiac monitor: Yes (NSR 88)    Interpretation:     Interpretation: normal    Rate:     ECG rate assessment: normal    Rhythm:     Rhythm: sinus rhythm    Ectopy:     Ectopy: none    QRS:     QRS axis:  Normal    QRS intervals:  Normal  Conduction:     Conduction: normal    ST segments:     ST segments:  Normal  T waves:     T waves: normal             Phone Contacts  ED Phone Contact    ED Course                               MDM  CritCare Time    Disposition  Final diagnoses:   Symptomatic anemia     Time reflects when diagnosis was documented in both MDM as applicable and the Disposition within this note     Time User Action Codes Description Comment    1/19/2019  7:10 AM Xiao Moon Add [D64 9] Symptomatic anemia       ED Disposition     None      Follow-up Information     Follow up With Specialties Details Why Contact Info    Dana Callaway DO Family Medicine Schedule an appointment as soon as possible for a visit in 1 week  [address] [place] [address]  [phone]            Patient's Medications   Discharge Prescriptions    No medications on file     No discharge procedures on file      ED Provider  Electronically Signed by           Marko Borrero MD  01/19/19 1101

## 2019-01-20 LAB
ABO GROUP BLD BPU: NORMAL
ABO GROUP BLD BPU: NORMAL
BPU ID: NORMAL
BPU ID: NORMAL
CROSSMATCH: NORMAL
CROSSMATCH: NORMAL
UNIT DISPENSE STATUS: NORMAL
UNIT DISPENSE STATUS: NORMAL
UNIT PRODUCT CODE: NORMAL
UNIT PRODUCT CODE: NORMAL
UNIT RH: NORMAL
UNIT RH: NORMAL

## 2019-01-21 ENCOUNTER — VBI (OUTPATIENT)
Dept: FAMILY MEDICINE CLINIC | Facility: CLINIC | Age: 39
End: 2019-01-21

## 2019-01-21 LAB
ATRIAL RATE: 88 BPM
P AXIS: 36 DEGREES
PR INTERVAL: 144 MS
QRS AXIS: -10 DEGREES
QRSD INTERVAL: 92 MS
QT INTERVAL: 326 MS
QTC INTERVAL: 394 MS
T WAVE AXIS: 27 DEGREES
VENTRICULAR RATE: 88 BPM

## 2019-01-21 PROCEDURE — 93010 ELECTROCARDIOGRAM REPORT: CPT | Performed by: INTERNAL MEDICINE

## 2019-01-21 NOTE — TELEPHONE ENCOUNTER
Carlie Gao    ED Visit Information     Ed visit date: 01/19/2019  Diagnosis Description: Symptomatic anemia  In Network? Yes  Discharge status: Discharge  Discharged with meds ? No  Number of ED visits to date: 1  ED Severity: N/A     Outreach Information    Outreach successful: Yes  Date letter mailed: N/A  Date Finalized: N/A    Care Coordination      Transportation issues ? N/A    Value Base Outreach    [facility] PCP:  Yes       1st attempt 1/21/2019 - LMOM for patient to return my call or to call [facility]  1/21/2019 Patient LMOM and said she was going to call [facility] and schedule a follow up appointment

## 2019-01-25 ENCOUNTER — TELEPHONE (OUTPATIENT)
Dept: FAMILY MEDICINE CLINIC | Facility: CLINIC | Age: 39
End: 2019-01-25

## 2019-06-26 ENCOUNTER — OFFICE VISIT (OUTPATIENT)
Dept: FAMILY MEDICINE CLINIC | Facility: CLINIC | Age: 39
End: 2019-06-26
Payer: COMMERCIAL

## 2019-06-26 ENCOUNTER — TELEPHONE (OUTPATIENT)
Dept: FAMILY MEDICINE CLINIC | Facility: CLINIC | Age: 39
End: 2019-06-26

## 2019-06-26 VITALS
TEMPERATURE: 97.4 F | RESPIRATION RATE: 16 BRPM | DIASTOLIC BLOOD PRESSURE: 52 MMHG | HEIGHT: 64 IN | SYSTOLIC BLOOD PRESSURE: 90 MMHG | HEART RATE: 72 BPM | WEIGHT: 150 LBS | BODY MASS INDEX: 25.61 KG/M2

## 2019-06-26 DIAGNOSIS — R10.13 DYSPEPSIA: Primary | ICD-10-CM

## 2019-06-26 DIAGNOSIS — Z13.6 SCREENING FOR CARDIOVASCULAR, RESPIRATORY, AND GENITOURINARY DISEASES: ICD-10-CM

## 2019-06-26 DIAGNOSIS — R13.10 DYSPHAGIA, UNSPECIFIED TYPE: ICD-10-CM

## 2019-06-26 DIAGNOSIS — K21.9 GASTROESOPHAGEAL REFLUX DISEASE, ESOPHAGITIS PRESENCE NOT SPECIFIED: ICD-10-CM

## 2019-06-26 DIAGNOSIS — Z98.84 S/P GASTRIC BYPASS: ICD-10-CM

## 2019-06-26 DIAGNOSIS — Z13.1 SCREENING FOR DIABETES MELLITUS (DM): ICD-10-CM

## 2019-06-26 DIAGNOSIS — Z13.89 SCREENING FOR CARDIOVASCULAR, RESPIRATORY, AND GENITOURINARY DISEASES: ICD-10-CM

## 2019-06-26 DIAGNOSIS — Z13.83 SCREENING FOR CARDIOVASCULAR, RESPIRATORY, AND GENITOURINARY DISEASES: ICD-10-CM

## 2019-06-26 PROCEDURE — 99214 OFFICE O/P EST MOD 30 MIN: CPT | Performed by: FAMILY MEDICINE

## 2019-06-26 PROCEDURE — 3008F BODY MASS INDEX DOCD: CPT | Performed by: FAMILY MEDICINE

## 2019-06-26 PROCEDURE — 1036F TOBACCO NON-USER: CPT | Performed by: FAMILY MEDICINE

## 2019-06-26 RX ORDER — PANTOPRAZOLE SODIUM 40 MG/1
40 TABLET, DELAYED RELEASE ORAL
Qty: 90 TABLET | Refills: 1 | Status: SHIPPED | OUTPATIENT
Start: 2019-06-26 | End: 2020-01-21 | Stop reason: SDUPTHER

## 2019-07-08 ENCOUNTER — HOSPITAL ENCOUNTER (OUTPATIENT)
Dept: RADIOLOGY | Facility: HOSPITAL | Age: 39
Discharge: HOME | End: 2019-07-08
Attending: FAMILY MEDICINE
Payer: COMMERCIAL

## 2019-07-08 ENCOUNTER — TRANSCRIBE ORDERS (OUTPATIENT)
Dept: ADMINISTRATIVE | Facility: HOSPITAL | Age: 39
End: 2019-07-08

## 2019-07-08 ENCOUNTER — APPOINTMENT (OUTPATIENT)
Dept: LAB | Facility: HOSPITAL | Age: 39
End: 2019-07-08
Attending: FAMILY MEDICINE
Payer: COMMERCIAL

## 2019-07-08 DIAGNOSIS — Z13.89 SCREENING FOR CARDIOVASCULAR, RESPIRATORY, AND GENITOURINARY DISEASES: ICD-10-CM

## 2019-07-08 DIAGNOSIS — R10.13 DYSPEPSIA: ICD-10-CM

## 2019-07-08 DIAGNOSIS — Z13.83 SCREENING FOR CARDIOVASCULAR, RESPIRATORY, AND GENITOURINARY DISEASES: ICD-10-CM

## 2019-07-08 DIAGNOSIS — R13.10 DYSPHAGIA, UNSPECIFIED TYPE: ICD-10-CM

## 2019-07-08 DIAGNOSIS — Z98.84 S/P GASTRIC BYPASS: ICD-10-CM

## 2019-07-08 DIAGNOSIS — K21.9 GASTROESOPHAGEAL REFLUX DISEASE, ESOPHAGITIS PRESENCE NOT SPECIFIED: ICD-10-CM

## 2019-07-08 DIAGNOSIS — Z13.6 SCREENING FOR CARDIOVASCULAR, RESPIRATORY, AND GENITOURINARY DISEASES: ICD-10-CM

## 2019-07-08 DIAGNOSIS — Z13.1 SCREENING FOR DIABETES MELLITUS (DM): ICD-10-CM

## 2019-07-08 LAB
ALBUMIN SERPL BCP-MCNC: 3.4 G/DL (ref 3.5–5)
ALP SERPL-CCNC: 54 U/L (ref 46–116)
ALT SERPL W P-5'-P-CCNC: 18 U/L (ref 12–78)
ANION GAP SERPL CALCULATED.3IONS-SCNC: 7 MMOL/L (ref 4–13)
AST SERPL W P-5'-P-CCNC: 10 U/L (ref 5–45)
BASOPHILS # BLD AUTO: 0.04 THOUSANDS/ΜL (ref 0–0.1)
BASOPHILS NFR BLD AUTO: 1 % (ref 0–1)
BILIRUB SERPL-MCNC: 0.2 MG/DL (ref 0.2–1)
BUN SERPL-MCNC: 15 MG/DL (ref 5–25)
CALCIUM SERPL-MCNC: 8.6 MG/DL (ref 8.3–10.1)
CHLORIDE SERPL-SCNC: 104 MMOL/L (ref 100–108)
CHOLEST SERPL-MCNC: 196 MG/DL (ref 50–200)
CO2 SERPL-SCNC: 30 MMOL/L (ref 21–32)
CREAT SERPL-MCNC: 0.75 MG/DL (ref 0.6–1.3)
EOSINOPHIL # BLD AUTO: 0.27 THOUSAND/ΜL (ref 0–0.61)
EOSINOPHIL NFR BLD AUTO: 6 % (ref 0–6)
ERYTHROCYTE [DISTWIDTH] IN BLOOD BY AUTOMATED COUNT: 16.8 % (ref 11.6–15.1)
GFR SERPL CREATININE-BSD FRML MDRD: 101 ML/MIN/1.73SQ M
GLUCOSE P FAST SERPL-MCNC: 97 MG/DL (ref 65–99)
HCT VFR BLD AUTO: 30.7 % (ref 34.8–46.1)
HDLC SERPL-MCNC: 103 MG/DL (ref 40–60)
HGB BLD-MCNC: 9.1 G/DL (ref 11.5–15.4)
IMM GRANULOCYTES # BLD AUTO: 0.02 THOUSAND/UL (ref 0–0.2)
IMM GRANULOCYTES NFR BLD AUTO: 0 % (ref 0–2)
IRON SATN MFR SERPL: 4 %
IRON SERPL-MCNC: 20 UG/DL (ref 50–170)
LDLC SERPL CALC-MCNC: 81 MG/DL (ref 0–100)
LIPASE SERPL-CCNC: 182 U/L (ref 73–393)
LYMPHOCYTES # BLD AUTO: 1.55 THOUSANDS/ΜL (ref 0.6–4.47)
LYMPHOCYTES NFR BLD AUTO: 32 % (ref 14–44)
MCH RBC QN AUTO: 24.5 PG (ref 26.8–34.3)
MCHC RBC AUTO-ENTMCNC: 29.6 G/DL (ref 31.4–37.4)
MCV RBC AUTO: 83 FL (ref 82–98)
MONOCYTES # BLD AUTO: 0.48 THOUSAND/ΜL (ref 0.17–1.22)
MONOCYTES NFR BLD AUTO: 10 % (ref 4–12)
NEUTROPHILS # BLD AUTO: 2.45 THOUSANDS/ΜL (ref 1.85–7.62)
NEUTS SEG NFR BLD AUTO: 51 % (ref 43–75)
NRBC BLD AUTO-RTO: 0 /100 WBCS
PLATELET # BLD AUTO: 249 THOUSANDS/UL (ref 149–390)
PMV BLD AUTO: 11.9 FL (ref 8.9–12.7)
POTASSIUM SERPL-SCNC: 3.9 MMOL/L (ref 3.5–5.3)
PROT SERPL-MCNC: 7.2 G/DL (ref 6.4–8.2)
RBC # BLD AUTO: 3.72 MILLION/UL (ref 3.81–5.12)
SODIUM SERPL-SCNC: 141 MMOL/L (ref 136–145)
TIBC SERPL-MCNC: 484 UG/DL (ref 250–450)
TRIGL SERPL-MCNC: 62 MG/DL
VIT B12 SERPL-MCNC: 229 PG/ML (ref 100–900)
WBC # BLD AUTO: 4.81 THOUSAND/UL (ref 4.31–10.16)

## 2019-07-08 PROCEDURE — 74240 X-RAY XM UPR GI TRC 1CNTRST: CPT

## 2019-07-08 PROCEDURE — 83550 IRON BINDING TEST: CPT

## 2019-07-08 PROCEDURE — 80053 COMPREHEN METABOLIC PANEL: CPT

## 2019-07-08 PROCEDURE — 80061 LIPID PANEL: CPT

## 2019-07-08 PROCEDURE — 83540 ASSAY OF IRON: CPT

## 2019-07-08 PROCEDURE — 82607 VITAMIN B-12: CPT

## 2019-07-08 PROCEDURE — 83690 ASSAY OF LIPASE: CPT

## 2019-07-08 PROCEDURE — 76705 ECHO EXAM OF ABDOMEN: CPT

## 2019-07-08 PROCEDURE — 85025 COMPLETE CBC W/AUTO DIFF WBC: CPT

## 2019-07-08 PROCEDURE — 36415 COLL VENOUS BLD VENIPUNCTURE: CPT

## 2019-07-11 DIAGNOSIS — K80.20 CALCULUS OF GALLBLADDER WITHOUT CHOLECYSTITIS WITHOUT OBSTRUCTION: Primary | ICD-10-CM

## 2019-07-26 ENCOUNTER — CONSULT (OUTPATIENT)
Dept: SURGERY | Facility: CLINIC | Age: 39
End: 2019-07-26
Payer: COMMERCIAL

## 2019-07-26 VITALS
TEMPERATURE: 97.2 F | WEIGHT: 147 LBS | HEIGHT: 64 IN | DIASTOLIC BLOOD PRESSURE: 68 MMHG | BODY MASS INDEX: 25.1 KG/M2 | HEART RATE: 70 BPM | SYSTOLIC BLOOD PRESSURE: 98 MMHG

## 2019-07-26 DIAGNOSIS — Z98.84 S/P GASTRIC BYPASS: ICD-10-CM

## 2019-07-26 DIAGNOSIS — K21.00 GASTROESOPHAGEAL REFLUX DISEASE WITH ESOPHAGITIS: ICD-10-CM

## 2019-07-26 DIAGNOSIS — T85.898A ULCER AT SITE OF SURGICAL ANASTOMOSIS FOLLOWING BYPASS OF STOMACH: ICD-10-CM

## 2019-07-26 DIAGNOSIS — K28.9 ULCER AT SITE OF SURGICAL ANASTOMOSIS FOLLOWING BYPASS OF STOMACH: ICD-10-CM

## 2019-07-26 DIAGNOSIS — K80.20 CALCULUS OF GALLBLADDER WITHOUT CHOLECYSTITIS WITHOUT OBSTRUCTION: ICD-10-CM

## 2019-07-26 DIAGNOSIS — D50.8 IRON DEFICIENCY ANEMIA SECONDARY TO INADEQUATE DIETARY IRON INTAKE: ICD-10-CM

## 2019-07-26 DIAGNOSIS — Q99.2 FRAGILE X SYNDROME: ICD-10-CM

## 2019-07-26 DIAGNOSIS — K27.9 PUD (PEPTIC ULCER DISEASE): Primary | ICD-10-CM

## 2019-07-26 PROCEDURE — 99244 OFF/OP CNSLTJ NEW/EST MOD 40: CPT | Performed by: SPECIALIST

## 2019-07-26 RX ORDER — CHLORHEXIDINE GLUCONATE 4 G/100ML
SOLUTION TOPICAL DAILY PRN
Status: CANCELLED | OUTPATIENT
Start: 2019-07-29

## 2019-07-26 NOTE — PROGRESS NOTES
History and Physical Examination - General Surgery   [facility]  Pedro Pablo Villeda 43 y o  female MRN: [id number]  Unit/Bed#:  Encounter: [id number] PCP: Adriana Hunter DO    History of Present Illness   Chief Complaint:  Right upper quadrant and upper back pain    HPI:  Pedro Pablo Villeda is a 43 y o  female who presents to office with right upper quadrant and back pain for few months duration of patient has a gastric bypass the surgery in past and has lost a considerable amount of weight  Workup for abdominal pain with the EGD barium study revealed the anastomotic ulcer workup did include ultrasound which did reveal multiple gallstones with the nose Large normal common bile duct    Patient was referred to us for possible laparoscopic cholecystectomy    Last the pain was the few weeks ago  Gastric bypass surgery 2004 lost 200 lb and gain 100 lb and then lost again  Extensive history of a anemia    Historical Information   The following portions of the patient's history were reviewed and updated as appropriate  Past Medical History:   Diagnosis Date    Anemia     Genital herpes in women     Herpes simplex infection      Past Surgical History:   Procedure Laterality Date    ABDOMINAL SURGERY      CYST REMOVAL      ESOPHAGOGASTRODUODENOSCOPY N/A 4/19/2016    Procedure: ESOPHAGOGASTRODUODENOSCOPY (EGD); Surgeon: Eric Gardner MD;  Location: [facility];   Service:    [address] [facility]      OOPHORECTOMY  2008    unilateral    OVARIAN CYST REMOVAL       Social History   Social History     Substance and Sexual Activity   Alcohol Use No    Comment: social use as per [employer]     Social History     Substance and Sexual Activity   Drug Use No    Comment: Marijuana as per [employer]     Social History     Tobacco Use   Smoking Status Never Smoker   Smokeless Tobacco Never Used     Family History:   Family History   Problem Relation Age of Onset    Cancer Mother     Diabetes Mother    Flaquita Reeves Hypothyroidism Mother     Mental illness Mother     Cancer Father     Diabetes Father     Mental illness Father        Meds/Allergies   Allergies   Allergen Reactions    Penicillins Anaphylaxis, Hives and Itching    Oxycodone-Acetaminophen Rash, Swelling and Vomiting       Current Outpatient Medications:     pantoprazole (PROTONIX) 40 mg tablet, Take 1 tablet (40 mg total) by mouth daily before breakfast, Disp: 90 tablet, Rfl: 1     REVIEW OF SYSTEMS  Constitutional:  Denies fever or chills   Eyes:  Denies change in visual acuity   HENT:  Denies nasal congestion or sore throat   Respiratory:  Denies cough or shortness of breath   Cardiovascular:  Denies chest pain or edema   GI:  Denies abdominal pain, nausea, vomiting, bloody stools or diarrhea   History of recurrent abdominal pain  And history of recurrent vomiting  :  Denies dysuria, frequency, difficulty in micturition and nocturia  Musculoskeletal:  Denies back pain or joint pain   Neurologic:  Denies headache, focal weakness or sensory changes   Endocrine:  Denies polyuria or polydipsia   Lymphatic:  Denies swollen glands   Psychiatric:  Denies depression or anxiety   history of bipolar disorder     Objective   Current Vitals:   BP 98/68 (BP Location: Right arm, Patient Position: Sitting, Cuff Size: Adult)   Pulse 70   Temp (!) 97 2 °F (36 2 °C) (Tympanic)   Ht 5' 4" (1 626 m)   Wt 66 7 kg (147 lb)   BMI 25 23 kg/m²   Body mass index is 25 23 kg/m²  PHYSICAL EXAMS  General:  Patient is not in acute distress, laying in the bed comfortably, awake, alert responding to commands,   HEENT:  Both pupils normal-size atraumatic, normocephalic, nonicteric  Neck:  JVP not raised  Trachea central  Respiratory:  normal Breath sounds clear to auscultation,  Cardiovascular:  S1-S2 normal without any murmur   GI:  Abdomen soft nontender   Liver and spleen normal size, no free fluid, hernial sites unremarkable without any cough impulse scar from previous surgery lower midline  Patient has lost considerable amount of weight the redundant abdominal skin  Musculoskeletal:  No back pain  Integument:  No skin rashes or ulceration  Lymphatic:  No cervical or inguinal lymphadenopathy  Neurologic:  Patient is awake alert, responding to command, well-oriented to time and place and person moving all extremities ambulating well    Visit Diagnosis:   Diagnoses and all orders for this visit:    PUD (peptic ulcer disease)    Calculus of gallbladder without cholecystitis without obstruction  -     Ambulatory referral to General Surgery    Gastroesophageal reflux disease with esophagitis    Iron deficiency anemia secondary to inadequate dietary iron intake    Fragile X syndrome    Ulcer at site of surgical anastomosis following bypass of stomach    S/P gastric bypass       Plan of care was discussed with family And patient in detail    Pertinent labs reviewed  Pertinent images and available reads personally reviewed  Pertinent notes reviewed    Assessment/Plan   Assessment:  Multiple gallstones  Normal CBD  Normal CMP  CBC chronic anemia secondary to gastric bypass and inadequate absorption    Plan:  Proper consent was obtained  The risks, benefits, alternatives,and probabilities of success were discussed in detail with no guarantee made as to outcome  All questions were answered to the patient's satisfaction  Patient was made aware that the most of his symptoms may be related to gastric bypass surgery which may not get better after removal of gallbladder button can view of multiple stones she needs gallbladder surgery as she has gastric bypass and if she double jaundice it will be very difficult to do ERCP    Preoperative prep was explained to the patient  Will repeat the blood work CBC CMP EKG prior to surgery    Counseling / Coordination of Care  Expained patient family in detailed about coordination of care   A description of the counseling / coordination of care:  I performed an interim history, pertinent images and labs, performed a physical examination to arrive at the plan delineated above with associated thought processes  Dr Francia Glaser MD  [place]    Office   Tel  [phone]  Fax   [phone]

## 2019-07-26 NOTE — PATIENT INSTRUCTIONS
[facility] Pre Procedure instructions  Katerin Dubon 43 y o  female MRN: [id number]  Encounter: [id number] PCP: Caryn Solis, DO        Preoperative instructions for major abdominal surgery    Please come fasting from midnight for the surgery    Advice light meal night before surgery    Take shower night before surgery and in the morning before coming to the hospital and apply Hebicleans antiseptic soap or lotion  To the site for operation area to prevent the postop surgical wound infection    Stop taking anticoagulation medicine  Plavix and aspirin 7 days prior to surgery  Coumadin 2 days prior to surgery  Xarelto /Eliquis / Pradaxa 72 hours after prior to surgery    Stop taking diabetic medicine long-acting insulin or oral diabetic medicine night before surgery and the morning dose before surgery  If you take these medications you will get hypoglycemia in the morning as you are fasting for surgery    Take regular laxative Colace/MiraLax/Dulcolax  take 1 of them for 2 days before surgery every night so he had a good bowel movement in the morning  Which will help in pain free postop recovery and will avoid constipation after surgery as all the anesthetic medications and pain medication will make you constipation    You will not be able to drive back by yourself and you will need somebody to drive you home from the hospital after your same day surgery  So make necessary arrangements    Only for patient undergoing large bowel surgery for colon cancer and diverticulitis  Take bowel prep if you are having:  Colonic surgery for colon cancer or diverticulitis    As prescribed   GoLYTELY/ Dulcolax prepped day before surgery start 8 a m  in the morning and drink glass every 15 minutes of bowel prep it should be be completed before 3:00 p m  You can't place GoLYTELY in the refrigerator night before make it more palatable    Take antibiotics after 3:00 p m   For bowel surgery Neomycin 500 mg 2 capsules at 3:00 p m  4:00 p m  And 6:00 p m  Take antibiotics after 3:00 p m  For bowel surgery Flagyl 500 mg 2 tablets at 3:00 p m  4 00 p m  And 6:00 p m  This is to sterilize your gut from inside for bowel surgery  And it has to be taken after completion of bowel prep    Take all your regular morning medicine specially heart and blood pressure medicine other than mentioned above with a sip of water in the morning      Pre-Surgery Instructions:   Medication Instructions    pantoprazole (PROTONIX) 40 mg tablet Take morning of surgery       Dr Chicho Penn MD  MS FRCS FACS  [facility]  [phone]

## 2019-07-26 NOTE — H&P (VIEW-ONLY)
History and Physical Examination - General Surgery   [facility]  Shona Schilder 43 y o  female MRN: [id number]  Unit/Bed#:  Encounter: [id number] PCP: Kevin Abraham DO    History of Present Illness   Chief Complaint:  Right upper quadrant and upper back pain    HPI:  Shona Schilder is a 43 y o  female who presents to office with right upper quadrant and back pain for few months duration of patient has a gastric bypass the surgery in past and has lost a considerable amount of weight  Workup for abdominal pain with the EGD barium study revealed the anastomotic ulcer workup did include ultrasound which did reveal multiple gallstones with the nose Large normal common bile duct    Patient was referred to us for possible laparoscopic cholecystectomy    Last the pain was the few weeks ago  Gastric bypass surgery 2004 lost 200 lb and gain 100 lb and then lost again  Extensive history of a anemia    Historical Information   The following portions of the patient's history were reviewed and updated as appropriate  Past Medical History:   Diagnosis Date    Anemia     Genital herpes in women     Herpes simplex infection      Past Surgical History:   Procedure Laterality Date    ABDOMINAL SURGERY      CYST REMOVAL      ESOPHAGOGASTRODUODENOSCOPY N/A 4/19/2016    Procedure: ESOPHAGOGASTRODUODENOSCOPY (EGD); Surgeon: Ruth Ann Oshea MD;  Location: [facility];   Service:    [address] [place] [address]      OOPHORECTOMY  2008    unilateral    OVARIAN CYST REMOVAL       Social History   Social History     Substance and Sexual Activity   Alcohol Use No    Comment: social use as per [employer]     Social History     Substance and Sexual Activity   Drug Use No    Comment: Marijuana as per [employer]     Social History     Tobacco Use   Smoking Status Never Smoker   Smokeless Tobacco Never Used     Family History:   Family History   Problem Relation Age of Onset    Cancer Mother     Diabetes Mother    Eddy Rudolph Hypothyroidism Mother     Mental illness Mother     Cancer Father     Diabetes Father     Mental illness Father        Meds/Allergies   Allergies   Allergen Reactions    Penicillins Anaphylaxis, Hives and Itching    Oxycodone-Acetaminophen Rash, Swelling and Vomiting       Current Outpatient Medications:     pantoprazole (PROTONIX) 40 mg tablet, Take 1 tablet (40 mg total) by mouth daily before breakfast, Disp: 90 tablet, Rfl: 1     REVIEW OF SYSTEMS  Constitutional:  Denies fever or chills   Eyes:  Denies change in visual acuity   HENT:  Denies nasal congestion or sore throat   Respiratory:  Denies cough or shortness of breath   Cardiovascular:  Denies chest pain or edema   GI:  Denies abdominal pain, nausea, vomiting, bloody stools or diarrhea   History of recurrent abdominal pain  And history of recurrent vomiting  :  Denies dysuria, frequency, difficulty in micturition and nocturia  Musculoskeletal:  Denies back pain or joint pain   Neurologic:  Denies headache, focal weakness or sensory changes   Endocrine:  Denies polyuria or polydipsia   Lymphatic:  Denies swollen glands   Psychiatric:  Denies depression or anxiety   history of bipolar disorder     Objective   Current Vitals:   BP 98/68 (BP Location: Right arm, Patient Position: Sitting, Cuff Size: Adult)   Pulse 70   Temp (!) 97 2 °F (36 2 °C) (Tympanic)   Ht 5' 4" (1 626 m)   Wt 66 7 kg (147 lb)   BMI 25 23 kg/m²   Body mass index is 25 23 kg/m²  PHYSICAL EXAMS  General:  Patient is not in acute distress, laying in the bed comfortably, awake, alert responding to commands,   HEENT:  Both pupils normal-size atraumatic, normocephalic, nonicteric  Neck:  JVP not raised  Trachea central  Respiratory:  normal Breath sounds clear to auscultation,  Cardiovascular:  S1-S2 normal without any murmur   GI:  Abdomen soft nontender   Liver and spleen normal size, no free fluid, hernial sites unremarkable without any cough impulse scar from previous surgery lower midline  Patient has lost considerable amount of weight the redundant abdominal skin  Musculoskeletal:  No back pain  Integument:  No skin rashes or ulceration  Lymphatic:  No cervical or inguinal lymphadenopathy  Neurologic:  Patient is awake alert, responding to command, well-oriented to time and place and person moving all extremities ambulating well    Visit Diagnosis:   Diagnoses and all orders for this visit:    PUD (peptic ulcer disease)    Calculus of gallbladder without cholecystitis without obstruction  -     Ambulatory referral to General Surgery    Gastroesophageal reflux disease with esophagitis    Iron deficiency anemia secondary to inadequate dietary iron intake    Fragile X syndrome    Ulcer at site of surgical anastomosis following bypass of stomach    S/P gastric bypass       Plan of care was discussed with family And patient in detail    Pertinent labs reviewed  Pertinent images and available reads personally reviewed  Pertinent notes reviewed    Assessment/Plan   Assessment:  Multiple gallstones  Normal CBD  Normal CMP  CBC chronic anemia secondary to gastric bypass and inadequate absorption    Plan:  Proper consent was obtained  The risks, benefits, alternatives,and probabilities of success were discussed in detail with no guarantee made as to outcome  All questions were answered to the patient's satisfaction  Patient was made aware that the most of his symptoms may be related to gastric bypass surgery which may not get better after removal of gallbladder button can view of multiple stones she needs gallbladder surgery as she has gastric bypass and if she double jaundice it will be very difficult to do ERCP    Preoperative prep was explained to the patient  Will repeat the blood work CBC CMP EKG prior to surgery    Counseling / Coordination of Care  Expained patient family in detailed about coordination of care   A description of the counseling / coordination of care:  I performed an interim history, pertinent images and labs, performed a physical examination to arrive at the plan delineated above with associated thought processes  Dr Jatinder Henning MD  [place]    Office   Tel  [phone]  Fax   [phone]

## 2019-07-26 NOTE — PRE-PROCEDURE INSTRUCTIONS
Pre-Surgery Instructions:   Medication Instructions    pantoprazole (PROTONIX) 40 mg tablet Instructed patient per Anesthesia Guidelines  Pre op instructions reviewed with pt via phone  Instructed to take protonix a m of surgery   sister Concepción Bey [phone]  My Surgical Experience    The following information was developed to assist you to prepare for your operation  What do I need to do before coming to the hospital?   Arrange for a responsible person to drive you to and from the hospital    Arrange care for your children at home  Children are not allowed in the recovery areas of the hospital   Plan to wear clothing that is easy to put on and take off  If you are having shoulder surgery, wear a shirt that buttons or zippers in the front  Bathing  o Shower the evening before and the morning of your surgery with an antibacterial soap  Please refer to the Pre Op Showering Instructions for Surgery Patients Sheet   o Remove nail polish and all body piercing jewelry  o Do not shave any body part for at least 24 hours before surgery-this includes face, arms, legs and upper body  Food  o Nothing to eat or drink after midnight the night before your surgery  This includes candy and chewing gum  o Exception: If your surgery is after 12:00pm (noon), you may have clear liquids such as 7-Up®, ginger ale, apple or cranberry juice, Jell-O®, water, or clear broth until 8:00 am  o Do not drink milk or juice with pulp on the morning before surgery  o Do not drink alcohol 24 hours before surgery  Medicine  o Follow instructions you received from your surgeon about which medicines you may take on the day of surgery  o If instructed to take medicine on the morning of surgery, take pills with just a small sip of water   Call your prescribing doctor for specific information on what to do if you take insulin    What should I bring to the hospital?    Bring:  Maya Zazueta or a walker, if you have them, for foot or knee surgery   A list of the daily medicines, vitamins, minerals, herbals and nutritional supplements you take  Include the dosages of medicines and the time you take them each day   Glasses, dentures or hearing aids   Minimal clothing; you will be wearing hospital sleepwear   Photo ID; required to verify your identity   If you have a Living Will or Power of , bring a copy of the documents   If you have an ostomy, bring an extra pouch and any supplies you use    Do not bring   Medicines or inhalers   Money, valuables or jewelry    What other information should I know about the day of surgery?  Notify your surgeons if you develop a cold, sore throat, cough, fever, rash or any other illness   Report to the Ambulatory Surgical/Same Day Surgery Unit   You will be instructed to stop at Registration only if you have not been pre-registered   Inform your  fi they do not stay that they will be asked by the staff to leave a phone number where they can be reached   Be available to be reached before surgery  In the event the operating room schedule changes, you may be asked to come in earlier or later than expected    *It is important to tell your doctor and others involved in your health care if you are taking or have been taking any non-prescription drugs, vitamins, minerals, herbals or other nutritional supplements   Any of these may interact with some food or medicines and cause a reaction

## 2019-07-28 ENCOUNTER — ANESTHESIA EVENT (OUTPATIENT)
Dept: PERIOP | Facility: HOSPITAL | Age: 39
DRG: 409 | End: 2019-07-28
Payer: COMMERCIAL

## 2019-07-29 ENCOUNTER — ANESTHESIA (OUTPATIENT)
Dept: PERIOP | Facility: HOSPITAL | Age: 39
DRG: 409 | End: 2019-07-29
Payer: COMMERCIAL

## 2019-07-29 ENCOUNTER — HOSPITAL ENCOUNTER (INPATIENT)
Facility: HOSPITAL | Age: 39
LOS: 3 days | Discharge: HOME | DRG: 409 | End: 2019-08-01
Attending: SPECIALIST | Admitting: SPECIALIST
Payer: COMMERCIAL

## 2019-07-29 ENCOUNTER — APPOINTMENT (OUTPATIENT)
Dept: RADIOLOGY | Facility: HOSPITAL | Age: 39
DRG: 409 | End: 2019-07-29
Payer: COMMERCIAL

## 2019-07-29 DIAGNOSIS — D50.8 IRON DEFICIENCY ANEMIA SECONDARY TO INADEQUATE DIETARY IRON INTAKE: ICD-10-CM

## 2019-07-29 DIAGNOSIS — K28.9 ULCER AT SITE OF SURGICAL ANASTOMOSIS FOLLOWING BYPASS OF STOMACH: ICD-10-CM

## 2019-07-29 DIAGNOSIS — K27.9 PUD (PEPTIC ULCER DISEASE): ICD-10-CM

## 2019-07-29 DIAGNOSIS — T85.898A ULCER AT SITE OF SURGICAL ANASTOMOSIS FOLLOWING BYPASS OF STOMACH: ICD-10-CM

## 2019-07-29 DIAGNOSIS — Z98.84 S/P GASTRIC BYPASS: ICD-10-CM

## 2019-07-29 DIAGNOSIS — K21.00 GASTROESOPHAGEAL REFLUX DISEASE WITH ESOPHAGITIS: ICD-10-CM

## 2019-07-29 DIAGNOSIS — K80.20 CALCULUS OF GALLBLADDER WITHOUT CHOLECYSTITIS WITHOUT OBSTRUCTION: Primary | ICD-10-CM

## 2019-07-29 DIAGNOSIS — Q99.2 FRAGILE X SYNDROME: ICD-10-CM

## 2019-07-29 LAB
ALBUMIN SERPL BCP-MCNC: 3.8 G/DL (ref 3.5–5)
ALP SERPL-CCNC: 59 U/L (ref 46–116)
ALT SERPL W P-5'-P-CCNC: 19 U/L (ref 12–78)
ANION GAP SERPL CALCULATED.3IONS-SCNC: 9 MMOL/L (ref 4–13)
AST SERPL W P-5'-P-CCNC: 14 U/L (ref 5–45)
ATRIAL RATE: 59 BPM
BILIRUB SERPL-MCNC: 0.4 MG/DL (ref 0.2–1)
BUN SERPL-MCNC: 17 MG/DL (ref 5–25)
CALCIUM SERPL-MCNC: 9 MG/DL (ref 8.3–10.1)
CHLORIDE SERPL-SCNC: 102 MMOL/L (ref 100–108)
CO2 SERPL-SCNC: 28 MMOL/L (ref 21–32)
CREAT SERPL-MCNC: 0.71 MG/DL (ref 0.6–1.3)
ERYTHROCYTE [DISTWIDTH] IN BLOOD BY AUTOMATED COUNT: 17.2 % (ref 11.6–15.1)
EXT PREGNANCY TEST URINE: NEGATIVE
EXT. CONTROL: NORMAL
GFR SERPL CREATININE-BSD FRML MDRD: 108 ML/MIN/1.73SQ M
GLUCOSE P FAST SERPL-MCNC: 97 MG/DL (ref 65–99)
GLUCOSE SERPL-MCNC: 97 MG/DL (ref 65–140)
HCG SERPL QL: NEGATIVE
HCT VFR BLD AUTO: 32.2 % (ref 34.8–46.1)
HGB BLD-MCNC: 9.5 G/DL (ref 11.5–15.4)
MCH RBC QN AUTO: 24 PG (ref 26.8–34.3)
MCHC RBC AUTO-ENTMCNC: 29.5 G/DL (ref 31.4–37.4)
MCV RBC AUTO: 81 FL (ref 82–98)
P AXIS: 28 DEGREES
PLATELET # BLD AUTO: 248 THOUSANDS/UL (ref 149–390)
PMV BLD AUTO: 12.6 FL (ref 8.9–12.7)
POTASSIUM SERPL-SCNC: 3.6 MMOL/L (ref 3.5–5.3)
PR INTERVAL: 164 MS
PROT SERPL-MCNC: 7.6 G/DL (ref 6.4–8.2)
QRS AXIS: -1 DEGREES
QRSD INTERVAL: 96 MS
QT INTERVAL: 378 MS
QTC INTERVAL: 374 MS
RBC # BLD AUTO: 3.96 MILLION/UL (ref 3.81–5.12)
SODIUM SERPL-SCNC: 139 MMOL/L (ref 136–145)
T WAVE AXIS: 39 DEGREES
VENTRICULAR RATE: 59 BPM
WBC # BLD AUTO: 4.1 THOUSAND/UL (ref 4.31–10.16)

## 2019-07-29 PROCEDURE — 93010 ELECTROCARDIOGRAM REPORT: CPT | Performed by: INTERNAL MEDICINE

## 2019-07-29 PROCEDURE — 81025 URINE PREGNANCY TEST: CPT | Performed by: SPECIALIST

## 2019-07-29 PROCEDURE — 84703 CHORIONIC GONADOTROPIN ASSAY: CPT | Performed by: SPECIALIST

## 2019-07-29 PROCEDURE — 0FN44ZZ RELEASE GALLBLADDER, PERCUTANEOUS ENDOSCOPIC APPROACH: ICD-10-PCS | Performed by: SPECIALIST

## 2019-07-29 PROCEDURE — BF13YZZ FLUOROSCOPY OF GALLBLADDER AND BILE DUCTS USING OTHER CONTRAST: ICD-10-PCS | Performed by: SPECIALIST

## 2019-07-29 PROCEDURE — 47605 CHOLECYSTECTOMY W/CHOLANG: CPT | Performed by: PHYSICIAN ASSISTANT

## 2019-07-29 PROCEDURE — 88304 TISSUE EXAM BY PATHOLOGIST: CPT | Performed by: PATHOLOGY

## 2019-07-29 PROCEDURE — 80053 COMPREHEN METABOLIC PANEL: CPT | Performed by: SPECIALIST

## 2019-07-29 PROCEDURE — 85027 COMPLETE CBC AUTOMATED: CPT | Performed by: SPECIALIST

## 2019-07-29 PROCEDURE — 74300 X-RAY BILE DUCTS/PANCREAS: CPT

## 2019-07-29 PROCEDURE — 0FT40ZZ RESECTION OF GALLBLADDER, OPEN APPROACH: ICD-10-PCS | Performed by: SPECIALIST

## 2019-07-29 PROCEDURE — 47605 CHOLECYSTECTOMY W/CHOLANG: CPT | Performed by: SPECIALIST

## 2019-07-29 PROCEDURE — 87081 CULTURE SCREEN ONLY: CPT | Performed by: SPECIALIST

## 2019-07-29 PROCEDURE — 93005 ELECTROCARDIOGRAM TRACING: CPT

## 2019-07-29 PROCEDURE — 0FC80ZZ EXTIRPATION OF MATTER FROM CYSTIC DUCT, OPEN APPROACH: ICD-10-PCS | Performed by: SPECIALIST

## 2019-07-29 PROCEDURE — 0FJ44ZZ INSPECTION OF GALLBLADDER, PERCUTANEOUS ENDOSCOPIC APPROACH: ICD-10-PCS | Performed by: SPECIALIST

## 2019-07-29 RX ORDER — CLINDAMYCIN PHOSPHATE 600 MG/50ML
600 INJECTION INTRAVENOUS EVERY 8 HOURS
Status: DISCONTINUED | OUTPATIENT
Start: 2019-07-29 | End: 2019-07-30

## 2019-07-29 RX ORDER — KETOROLAC TROMETHAMINE 30 MG/ML
15 INJECTION, SOLUTION INTRAMUSCULAR; INTRAVENOUS EVERY 6 HOURS SCHEDULED
Status: COMPLETED | OUTPATIENT
Start: 2019-07-29 | End: 2019-07-31

## 2019-07-29 RX ORDER — MIDAZOLAM HYDROCHLORIDE 1 MG/ML
INJECTION INTRAMUSCULAR; INTRAVENOUS AS NEEDED
Status: DISCONTINUED | OUTPATIENT
Start: 2019-07-29 | End: 2019-07-29 | Stop reason: SURG

## 2019-07-29 RX ORDER — FENTANYL CITRATE 50 UG/ML
INJECTION, SOLUTION INTRAMUSCULAR; INTRAVENOUS AS NEEDED
Status: DISCONTINUED | OUTPATIENT
Start: 2019-07-29 | End: 2019-07-29 | Stop reason: SURG

## 2019-07-29 RX ORDER — HEPARIN SODIUM 5000 [USP'U]/ML
5000 INJECTION, SOLUTION INTRAVENOUS; SUBCUTANEOUS EVERY 8 HOURS SCHEDULED
Status: DISCONTINUED | OUTPATIENT
Start: 2019-07-30 | End: 2019-07-31

## 2019-07-29 RX ORDER — PROPOFOL 10 MG/ML
INJECTION, EMULSION INTRAVENOUS AS NEEDED
Status: DISCONTINUED | OUTPATIENT
Start: 2019-07-29 | End: 2019-07-29 | Stop reason: SURG

## 2019-07-29 RX ORDER — SODIUM CHLORIDE, SODIUM LACTATE, POTASSIUM CHLORIDE, CALCIUM CHLORIDE 600; 310; 30; 20 MG/100ML; MG/100ML; MG/100ML; MG/100ML
75 INJECTION, SOLUTION INTRAVENOUS CONTINUOUS
Status: DISCONTINUED | OUTPATIENT
Start: 2019-07-29 | End: 2019-07-31

## 2019-07-29 RX ORDER — NALOXONE HYDROCHLORIDE 0.4 MG/ML
0.04 INJECTION, SOLUTION INTRAMUSCULAR; INTRAVENOUS; SUBCUTANEOUS
Status: DISCONTINUED | OUTPATIENT
Start: 2019-07-29 | End: 2019-07-31

## 2019-07-29 RX ORDER — BUPIVACAINE HYDROCHLORIDE AND EPINEPHRINE 5; 5 MG/ML; UG/ML
INJECTION, SOLUTION EPIDURAL; INTRACAUDAL; PERINEURAL AS NEEDED
Status: DISCONTINUED | OUTPATIENT
Start: 2019-07-29 | End: 2019-07-29 | Stop reason: HOSPADM

## 2019-07-29 RX ORDER — GLYCOPYRROLATE 0.2 MG/ML
INJECTION INTRAMUSCULAR; INTRAVENOUS AS NEEDED
Status: DISCONTINUED | OUTPATIENT
Start: 2019-07-29 | End: 2019-07-29 | Stop reason: SURG

## 2019-07-29 RX ORDER — LIDOCAINE HYDROCHLORIDE 20 MG/ML
INJECTION, SOLUTION INFILTRATION; PERINEURAL AS NEEDED
Status: DISCONTINUED | OUTPATIENT
Start: 2019-07-29 | End: 2019-07-29 | Stop reason: SURG

## 2019-07-29 RX ORDER — SODIUM CHLORIDE, SODIUM LACTATE, POTASSIUM CHLORIDE, CALCIUM CHLORIDE 600; 310; 30; 20 MG/100ML; MG/100ML; MG/100ML; MG/100ML
75 INJECTION, SOLUTION INTRAVENOUS CONTINUOUS
Status: DISCONTINUED | OUTPATIENT
Start: 2019-07-29 | End: 2019-07-29 | Stop reason: SDUPTHER

## 2019-07-29 RX ORDER — DEXAMETHASONE SODIUM PHOSPHATE 4 MG/ML
INJECTION, SOLUTION INTRA-ARTICULAR; INTRALESIONAL; INTRAMUSCULAR; INTRAVENOUS; SOFT TISSUE AS NEEDED
Status: DISCONTINUED | OUTPATIENT
Start: 2019-07-29 | End: 2019-07-29 | Stop reason: SURG

## 2019-07-29 RX ORDER — FENTANYL CITRATE/PF 50 MCG/ML
25 SYRINGE (ML) INJECTION
Status: COMPLETED | OUTPATIENT
Start: 2019-07-29 | End: 2019-07-29

## 2019-07-29 RX ORDER — ONDANSETRON 2 MG/ML
INJECTION INTRAMUSCULAR; INTRAVENOUS AS NEEDED
Status: DISCONTINUED | OUTPATIENT
Start: 2019-07-29 | End: 2019-07-29 | Stop reason: SURG

## 2019-07-29 RX ORDER — HYDROMORPHONE HCL/PF 1 MG/ML
0.5 SYRINGE (ML) INJECTION
Status: DISCONTINUED | OUTPATIENT
Start: 2019-07-29 | End: 2019-07-29 | Stop reason: HOSPADM

## 2019-07-29 RX ORDER — NEOSTIGMINE METHYLSULFATE 1 MG/ML
INJECTION INTRAVENOUS AS NEEDED
Status: DISCONTINUED | OUTPATIENT
Start: 2019-07-29 | End: 2019-07-29 | Stop reason: SURG

## 2019-07-29 RX ORDER — LEVOFLOXACIN 5 MG/ML
500 INJECTION, SOLUTION INTRAVENOUS EVERY 24 HOURS
Status: DISCONTINUED | OUTPATIENT
Start: 2019-07-29 | End: 2019-07-29

## 2019-07-29 RX ORDER — ONDANSETRON 2 MG/ML
4 INJECTION INTRAMUSCULAR; INTRAVENOUS EVERY 6 HOURS PRN
Status: DISCONTINUED | OUTPATIENT
Start: 2019-07-29 | End: 2019-08-01 | Stop reason: HOSPADM

## 2019-07-29 RX ORDER — CLINDAMYCIN PHOSPHATE 600 MG/50ML
600 INJECTION INTRAVENOUS EVERY 8 HOURS
Status: DISCONTINUED | OUTPATIENT
Start: 2019-07-29 | End: 2019-07-29

## 2019-07-29 RX ORDER — CHLORHEXIDINE GLUCONATE 4 G/100ML
SOLUTION TOPICAL DAILY PRN
Status: DISCONTINUED | OUTPATIENT
Start: 2019-07-29 | End: 2019-07-29 | Stop reason: HOSPADM

## 2019-07-29 RX ORDER — MAGNESIUM HYDROXIDE 1200 MG/15ML
LIQUID ORAL AS NEEDED
Status: DISCONTINUED | OUTPATIENT
Start: 2019-07-29 | End: 2019-07-29 | Stop reason: HOSPADM

## 2019-07-29 RX ORDER — DIPHENHYDRAMINE HYDROCHLORIDE 50 MG/ML
25 INJECTION INTRAMUSCULAR; INTRAVENOUS EVERY 6 HOURS PRN
Status: DISCONTINUED | OUTPATIENT
Start: 2019-07-29 | End: 2019-08-01 | Stop reason: HOSPADM

## 2019-07-29 RX ORDER — ROCURONIUM BROMIDE 10 MG/ML
INJECTION, SOLUTION INTRAVENOUS AS NEEDED
Status: DISCONTINUED | OUTPATIENT
Start: 2019-07-29 | End: 2019-07-29 | Stop reason: SURG

## 2019-07-29 RX ADMIN — PROPOFOL 50 MG: 10 INJECTION, EMULSION INTRAVENOUS at 12:40

## 2019-07-29 RX ADMIN — GLYCOPYRROLATE 0.6 MG: 0.2 INJECTION, SOLUTION INTRAMUSCULAR; INTRAVENOUS at 14:08

## 2019-07-29 RX ADMIN — HYDROMORPHONE HYDROCHLORIDE 0.5 MG: 1 INJECTION, SOLUTION INTRAMUSCULAR; INTRAVENOUS; SUBCUTANEOUS at 15:53

## 2019-07-29 RX ADMIN — MIDAZOLAM HYDROCHLORIDE 2 MG: 1 INJECTION, SOLUTION INTRAMUSCULAR; INTRAVENOUS at 11:32

## 2019-07-29 RX ADMIN — ONDANSETRON 4 MG: 2 INJECTION INTRAMUSCULAR; INTRAVENOUS at 12:03

## 2019-07-29 RX ADMIN — CLINDAMYCIN PHOSPHATE 600 MG: 12 INJECTION, SOLUTION INTRAMUSCULAR; INTRAVENOUS at 11:40

## 2019-07-29 RX ADMIN — PROPOFOL 100 MG: 10 INJECTION, EMULSION INTRAVENOUS at 12:28

## 2019-07-29 RX ADMIN — ONDANSETRON 4 MG: 2 INJECTION INTRAMUSCULAR; INTRAVENOUS at 20:23

## 2019-07-29 RX ADMIN — SODIUM CHLORIDE, SODIUM LACTATE, POTASSIUM CHLORIDE, AND CALCIUM CHLORIDE 75 ML/HR: .6; .31; .03; .02 INJECTION, SOLUTION INTRAVENOUS at 18:52

## 2019-07-29 RX ADMIN — FENTANYL CITRATE: 50 INJECTION, SOLUTION INTRAMUSCULAR; INTRAVENOUS at 16:25

## 2019-07-29 RX ADMIN — FENTANYL CITRATE 25 MCG: 50 INJECTION, SOLUTION INTRAMUSCULAR; INTRAVENOUS at 15:11

## 2019-07-29 RX ADMIN — FENTANYL CITRATE 50 MCG: 50 INJECTION, SOLUTION INTRAMUSCULAR; INTRAVENOUS at 12:58

## 2019-07-29 RX ADMIN — PROPOFOL 30 MG: 10 INJECTION, EMULSION INTRAVENOUS at 14:16

## 2019-07-29 RX ADMIN — PROPOFOL 200 MG: 10 INJECTION, EMULSION INTRAVENOUS at 12:04

## 2019-07-29 RX ADMIN — SODIUM CHLORIDE, SODIUM LACTATE, POTASSIUM CHLORIDE, AND CALCIUM CHLORIDE: .6; .31; .03; .02 INJECTION, SOLUTION INTRAVENOUS at 12:45

## 2019-07-29 RX ADMIN — CLINDAMYCIN PHOSPHATE 600 MG: 600 INJECTION, SOLUTION INTRAVENOUS at 21:31

## 2019-07-29 RX ADMIN — FENTANYL CITRATE 50 MCG: 50 INJECTION, SOLUTION INTRAMUSCULAR; INTRAVENOUS at 11:45

## 2019-07-29 RX ADMIN — SODIUM CHLORIDE, SODIUM LACTATE, POTASSIUM CHLORIDE, AND CALCIUM CHLORIDE: .6; .31; .03; .02 INJECTION, SOLUTION INTRAVENOUS at 10:10

## 2019-07-29 RX ADMIN — FENTANYL CITRATE 25 MCG: 50 INJECTION, SOLUTION INTRAMUSCULAR; INTRAVENOUS at 15:00

## 2019-07-29 RX ADMIN — KETOROLAC TROMETHAMINE 15 MG: 30 INJECTION, SOLUTION INTRAMUSCULAR at 20:23

## 2019-07-29 RX ADMIN — FENTANYL CITRATE 50 MCG: 50 INJECTION, SOLUTION INTRAMUSCULAR; INTRAVENOUS at 14:18

## 2019-07-29 RX ADMIN — DEXAMETHASONE SODIUM PHOSPHATE 4 MG: 4 INJECTION, SOLUTION INTRA-ARTICULAR; INTRALESIONAL; INTRAMUSCULAR; INTRAVENOUS; SOFT TISSUE at 12:04

## 2019-07-29 RX ADMIN — ROCURONIUM BROMIDE 50 MG: 10 INJECTION INTRAVENOUS at 11:39

## 2019-07-29 RX ADMIN — ONDANSETRON 4 MG: 2 INJECTION INTRAMUSCULAR; INTRAVENOUS at 14:21

## 2019-07-29 RX ADMIN — FENTANYL CITRATE 25 MCG: 50 INJECTION, SOLUTION INTRAMUSCULAR; INTRAVENOUS at 15:05

## 2019-07-29 RX ADMIN — FENTANYL CITRATE 25 MCG: 50 INJECTION, SOLUTION INTRAMUSCULAR; INTRAVENOUS at 14:55

## 2019-07-29 RX ADMIN — FENTANYL CITRATE 50 MCG: 50 INJECTION, SOLUTION INTRAMUSCULAR; INTRAVENOUS at 11:39

## 2019-07-29 RX ADMIN — HYDROMORPHONE HYDROCHLORIDE 0.5 MG: 1 INJECTION, SOLUTION INTRAMUSCULAR; INTRAVENOUS; SUBCUTANEOUS at 15:22

## 2019-07-29 RX ADMIN — ROCURONIUM BROMIDE 10 MG: 10 INJECTION INTRAVENOUS at 13:17

## 2019-07-29 RX ADMIN — FENTANYL CITRATE 50 MCG: 50 INJECTION, SOLUTION INTRAMUSCULAR; INTRAVENOUS at 12:40

## 2019-07-29 RX ADMIN — FENTANYL CITRATE 50 MCG: 50 INJECTION, SOLUTION INTRAMUSCULAR; INTRAVENOUS at 13:58

## 2019-07-29 RX ADMIN — NEOSTIGMINE METHYLSULFATE 4 MG: 1 INJECTION INTRAVENOUS at 14:08

## 2019-07-29 RX ADMIN — LIDOCAINE HYDROCHLORIDE 5 ML: 20 INJECTION, SOLUTION INFILTRATION; PERINEURAL at 11:39

## 2019-07-29 NOTE — ANESTHESIA POSTPROCEDURE EVALUATION
Post-Op Assessment Note    CV Status:  Stable  Pain Score: 0    Pain management: adequate     Mental Status:  Alert and awake   Hydration Status:  Stable   PONV Controlled:  Controlled   Airway Patency:  Patent and adequate   Post Op Vitals Reviewed: Yes      Staff: CRNA           /86 (07/29/19 1442)    Temp (P) 98 6 °F (37 °C) (07/29/19 1442)    Pulse 84 (07/29/19 1442)   Resp (P) 16 (07/29/19 1442)    SpO2 100 % (07/29/19 1442)

## 2019-07-29 NOTE — OP NOTE
General SurgeryCHOLECYSTECTOMY LAPAROSCOPIC Op Note    Mauricio Samuels  7/29/2019    Pre-op Diagnosis:   Calculus of gallbladder without cholecystitis without obstruction [K80 20]  PUD (peptic ulcer disease) [K27 9]  Gastroesophageal reflux disease with esophagitis [K21 0]  Iron deficiency anemia secondary to inadequate dietary iron intake [D50 8]  Fragile X syndrome [Q99 2]  Ulcer at site of surgical anastomosis following bypass of stomach [K28 9]  S/P gastric bypass [Z98 84]    PMH  Past Medical History:   Diagnosis Date    Anemia     Bipolar 2 disorder ([facility] )     Genital herpes in women     GERD (gastroesophageal reflux disease)     Herpes simplex infection     History of transfusion     x2       Post-op Diagnosis:   Patient Active Problem List   Diagnosis    Anemia    PUD (peptic ulcer disease)    Bipolar disorder ([facility] )    Chronic bilateral low back pain without sciatica    Esophageal candidiasis ([facility] )    Fragile X syndrome    Ulcer at site of surgical anastomosis following bypass of stomach    Dyspepsia    Dysphagia    Gastroesophageal reflux disease    Screening for cardiovascular, respiratory, and genitourinary diseases    Screening for diabetes mellitus (DM)    S/P gastric bypass    Gallstones without obstruction of gallbladder    Calculus of gallbladder without cholecystitis without obstruction    Gastroesophageal reflux disease with esophagitis    Iron deficiency anemia secondary to inadequate dietary iron intake       Procedure(s):  CHOLECYSTECTOMY LAPAROSCOPIC    Surgeon(s):  Henrry Neri MD    Anesthesia:  General    Operative Findings:  Large dilated J shaped gallbladder  Short dilated cystic duct with impacted stone in the cystic duct  Multiple adhesions over the gallbladder and between liver and abdominal wall    Assistant:  Mr Melquiades Tsai was utilized as a first assistant as there is no surgical residency program at [facility]    Staff: Circulator: Sheila Lopez RN; Manuel Rouse RN  Scrub Person: Hilary Becerril was identified by me  Proper consent was obtained  The risks, benefits, alternatives,and probabilities of success were discussed in detail with no guarantee made as to outcome  All questions were answered to the patient's satisfaction  Site was marked prior coming to OR Proper detailed consent was obtained from patient risk and benefits were explained to patient and family in detail prior to coming to Operating Room    Operative site was cleansed with ChloraPrep and draped in usual sterile fashion  Kimberlee Barahona was given pre-operative antibiotics  There is no height or weight on file to calculate BMI  Shelba Rust is ASA 2 and Fire risk- 3  Shelba Rust was re-identified in the OR  Site was identified and detailed timeout was performed with Anesthesia and OR staff  Infraumbilical transverse incision was made  The skin and subcutaneous tissue was cut along the line of incision  With open Malcolm technique a 10mm port was placed  Gallbladder was identified  Three other ports were placed, one in the subxiphoid 10mm subcostal, one 5mm midclavicular subcostal and other one 5mm ant axillary subcostal  Gallbladder was grasped with a grasper introduced from the lateralmost 5 mm port at the fundus and was retracted upward laterally  Multiple adhesions were noted with the gallbladder and mentum and colon and adhesions between liver and the abdominal wall there were carefully dissected with the Endo scissor attached to the Bovie  Another grasper was introduced from the midclavicular port 5 mm and Lainez's pouch of the gallbladder was grasped and was retracted downward and laterally   Patient has a very dilated short cystic duct which were carefully identified  Cystic duct and artery were identified  Cystic artery was ligated and cut    Cystic duct to at the multiple stones which were impacted up to the cystic duct common bile duct junction multiple attempts were made to milk the stone to the gallbladder were unable to milk the stones to the gallbladder  A decision was made to convert to open procedure    Right subcostal Kocher incision was made Skin and subcutaneous tissue was cut along the line of incision  External oblique rectus sheath Internal Oblique muscle Were identified which was cut along the line of incision proper hemostasis was achieved the peritoneum was identified and it was cut along the incision      Transverse colon omentum and the stomach and Duodenum was retracted after placement of for 3 sponges  Gallbladder was carefully grasped at the fundus  was grasped with a empty sponge holding forceps and then fundus first technique was carefully dissected cystic duct was identified so gallbladder side the of the cystic duct were ligated with the Vicryl 0 of the loop was passed the on between the cystic duct and common bile duct side of the cystic duct cystic duct was partially opened near the cystic duct gallbladder junction 3 large stones were carefully milked out and delivered through the opening from the cystic duct  Free flow of clear bile was noted bear through the cystic duct opening    A decision was made to do per operative cholangiogram    Or cholangiogram catheter was passed through the opening and 1st secured with the Vicryl loop on tie     Per operative cholangiogram was performed with 50% radioiodine contrast and 50% saline  Good flow of contrast was noted in the duodenum without any negative for defect in the common bile duct    Catheter was removed and the cystic duct was tied  With Vicryl and clipped twice    Gallbladder was then removed the from the field  Abdominal cavity was thoroughly lavaged copious or fluid for irrigation no active bleeding was noted from the gallbladder bed a Surgicel was placed on the gallbladder bed the for hemostasis    Resultant wound was then closed in 2 layers peritoneum with Vicryl 0 continue sutures and external oblique and rectus abdominis muscles were approximated with PDS 0 2 sutures were used 1 from the upper of the incision 1 1 from the lower end of the incision     Cystic duct was doubly ligated with Vicryl zero tie and clipped with the large Endo Clip      Cystic duct was cut towards the gallbladder side and the gallbladder was removed in toto  Gallbladder bed was inspected some active bleeding was noted  Which was cauterized with Bovie into  Surgicel was placed over the raw surface of the gallbladder      A Italo-Kingsley drain was placed  Through a stab incision to the lateral abdominal wall  Abdominal cavity was thoroughly lavaged with antibiotic mix of fluid for irrigation  Gallbladder bed was reinspected no active bleeding was noted      Abdominal cavity was then closed in 2 layers peritoneum and internal oblique with chromic catgut 0 running sutures  External oblique the and internal oblique were approximated with the PDS zero loop and sutures to sutures to use one from the medial end of the incision and one from the lateral incision  SHAYLEE drain was secured with 3-0 nylon skin was approximated with skin stapling device      Supraumbilical incision was closed in 2 layers with the Vicryl zero for sheath and the skin was approximated with skin stapling device all the layers were thoroughly lavaged with copious amount of free fluid for irrigation before closure incision was a thoroughly infiltrated with Marcaine with Epinephrine  A sterile dressing was placed over the incision  A Italo-Kingsley drain was placed  All the 10 mm port sites were closed in double layers with figure of 8-0 Vicryl, 2-0 for the sheath and the skin was approximated with subcuticular Monocryl  All the ports were thoroughly infiltrated with Marcaine with Epinephrine    Sterile dressing was applied    Frankey Grumet tolerated the procedure well, remain stable during and after the procedure  At the end of the procedure all the instruments, needle and sponge counts were noted to be correct  Sterile dresing was applied and patient was transfered to recovery area in stable condition  I was present for the entire procedure No qualified resident was available  A physician's assistant was required due to the intensity of the surgery  This no residency program in this hospital no resident was available to assist  Physician assistant was required for camera operation, hemostasis retraction and the closure of the surgical wound   Physician assistant was present during the entire procedure    Estimated Blood Loss:  Minimal    Specimens:  Order Name Source Comment Collection Info Order Time   CBC AND PLATELET Arm, Right  Collected By: Eugene Andrews RN 7/29/2019  9:41 AM   COMPREHENSIVE METABOLIC PANEL Arm, Right  Collected By: Eugene Andrews RN 7/29/2019  9:41 AM   PREGNANCY TEST (HCG QUALITATIVE) Arm, Right  Collected By: Eugene Andrews RN 7/29/2019  9:41 AM   TISSUE EXAM Gallbladder  Collected By: Raffy Mckee MD 7/29/2019 12:19 PM         Drains:  Round SHAYLEE drain    Complications:  Nil      I was present for the entire procedure    Patient Disposition:  PACU       Raffy Mckee MD MS FRCS [facility]  Date: 7/29/2019  Time: 2:27 PM  Copy to PCP: Bill Antoine DO

## 2019-07-29 NOTE — ANESTHESIA PREPROCEDURE EVALUATION
Review of Systems/Medical History  Patient summary reviewed  Chart reviewed  No history of anesthetic complications     Cardiovascular   Pulmonary       GI/Hepatic    PUD (h/o ulcer), GERD well controlled,   Comment: S/p gastric bypass          Endo/Other     GYN      Comment: S/p unilateral oophorectomy     Hematology  Anemia iron deficiency anemia,     Musculoskeletal  Back pain (occasional) , lumbar pain,        Neurology   Psychology   Depression , bipolar disorder,              Physical Exam    Airway    Mallampati score: II  TM Distance: >3 FB  Neck ROM: full     Dental       Cardiovascular  Rhythm: regular, Rate: normal,     Pulmonary  Breath sounds clear to auscultation,     Other Findings        Anesthesia Plan  ASA Score- 2     Anesthesia Type- general with ASA Monitors  Additional Monitors:   Airway Plan: ETT  Plan Factors-    Induction- intravenous  Postoperative Plan- Plan for postoperative opioid use  Planned trial extubation    Informed Consent- Anesthetic plan and risks discussed with patient  I personally reviewed this patient with the CRNA  Discussed and agreed on the Anesthesia Plan with the CRNA  Rosella Goldmann

## 2019-07-30 LAB
ALBUMIN SERPL BCP-MCNC: 3 G/DL (ref 3.5–5)
ALP SERPL-CCNC: 50 U/L (ref 46–116)
ALT SERPL W P-5'-P-CCNC: 48 U/L (ref 12–78)
ANION GAP SERPL CALCULATED.3IONS-SCNC: 6 MMOL/L (ref 4–13)
AST SERPL W P-5'-P-CCNC: 50 U/L (ref 5–45)
BASOPHILS # BLD AUTO: 0.03 THOUSANDS/ΜL (ref 0–0.1)
BASOPHILS NFR BLD AUTO: 1 % (ref 0–1)
BILIRUB SERPL-MCNC: 0.4 MG/DL (ref 0.2–1)
BUN SERPL-MCNC: 12 MG/DL (ref 5–25)
CALCIUM SERPL-MCNC: 8.3 MG/DL (ref 8.3–10.1)
CHLORIDE SERPL-SCNC: 103 MMOL/L (ref 100–108)
CO2 SERPL-SCNC: 28 MMOL/L (ref 21–32)
CREAT SERPL-MCNC: 0.64 MG/DL (ref 0.6–1.3)
EOSINOPHIL # BLD AUTO: 0.04 THOUSAND/ΜL (ref 0–0.61)
EOSINOPHIL NFR BLD AUTO: 1 % (ref 0–6)
ERYTHROCYTE [DISTWIDTH] IN BLOOD BY AUTOMATED COUNT: 17.2 % (ref 11.6–15.1)
GFR SERPL CREATININE-BSD FRML MDRD: 113 ML/MIN/1.73SQ M
GLUCOSE SERPL-MCNC: 96 MG/DL (ref 65–140)
HCT VFR BLD AUTO: 27.1 % (ref 34.8–46.1)
HGB BLD-MCNC: 8 G/DL (ref 11.5–15.4)
IMM GRANULOCYTES # BLD AUTO: 0.02 THOUSAND/UL (ref 0–0.2)
IMM GRANULOCYTES NFR BLD AUTO: 0 % (ref 0–2)
LYMPHOCYTES # BLD AUTO: 1.46 THOUSANDS/ΜL (ref 0.6–4.47)
LYMPHOCYTES NFR BLD AUTO: 28 % (ref 14–44)
MAGNESIUM SERPL-MCNC: 1.8 MG/DL (ref 1.6–2.6)
MCH RBC QN AUTO: 23.5 PG (ref 26.8–34.3)
MCHC RBC AUTO-ENTMCNC: 29.5 G/DL (ref 31.4–37.4)
MCV RBC AUTO: 80 FL (ref 82–98)
MONOCYTES # BLD AUTO: 0.7 THOUSAND/ΜL (ref 0.17–1.22)
MONOCYTES NFR BLD AUTO: 13 % (ref 4–12)
NEUTROPHILS # BLD AUTO: 3.06 THOUSANDS/ΜL (ref 1.85–7.62)
NEUTS SEG NFR BLD AUTO: 57 % (ref 43–75)
NRBC BLD AUTO-RTO: 0 /100 WBCS
PHOSPHATE SERPL-MCNC: 3.4 MG/DL (ref 2.7–4.5)
PLATELET # BLD AUTO: 193 THOUSANDS/UL (ref 149–390)
PMV BLD AUTO: 12.1 FL (ref 8.9–12.7)
POTASSIUM SERPL-SCNC: 3.5 MMOL/L (ref 3.5–5.3)
PROT SERPL-MCNC: 6.2 G/DL (ref 6.4–8.2)
RBC # BLD AUTO: 3.4 MILLION/UL (ref 3.81–5.12)
SODIUM SERPL-SCNC: 137 MMOL/L (ref 136–145)
WBC # BLD AUTO: 5.31 THOUSAND/UL (ref 4.31–10.16)

## 2019-07-30 PROCEDURE — 80053 COMPREHEN METABOLIC PANEL: CPT | Performed by: SPECIALIST

## 2019-07-30 PROCEDURE — 84100 ASSAY OF PHOSPHORUS: CPT | Performed by: SPECIALIST

## 2019-07-30 PROCEDURE — 83735 ASSAY OF MAGNESIUM: CPT | Performed by: SPECIALIST

## 2019-07-30 PROCEDURE — 99024 POSTOP FOLLOW-UP VISIT: CPT | Performed by: SPECIALIST

## 2019-07-30 PROCEDURE — 85025 COMPLETE CBC W/AUTO DIFF WBC: CPT | Performed by: SPECIALIST

## 2019-07-30 RX ORDER — PANTOPRAZOLE SODIUM 40 MG/1
40 TABLET, DELAYED RELEASE ORAL
Status: DISCONTINUED | OUTPATIENT
Start: 2019-07-31 | End: 2019-07-30

## 2019-07-30 RX ORDER — PANTOPRAZOLE SODIUM 40 MG/1
40 TABLET, DELAYED RELEASE ORAL
Status: DISCONTINUED | OUTPATIENT
Start: 2019-07-30 | End: 2019-08-01 | Stop reason: HOSPADM

## 2019-07-30 RX ADMIN — KETOROLAC TROMETHAMINE 15 MG: 30 INJECTION, SOLUTION INTRAMUSCULAR at 22:41

## 2019-07-30 RX ADMIN — SODIUM CHLORIDE, SODIUM LACTATE, POTASSIUM CHLORIDE, AND CALCIUM CHLORIDE 75 ML/HR: .6; .31; .03; .02 INJECTION, SOLUTION INTRAVENOUS at 01:48

## 2019-07-30 RX ADMIN — HEPARIN SODIUM 5000 UNITS: 5000 INJECTION INTRAVENOUS; SUBCUTANEOUS at 14:45

## 2019-07-30 RX ADMIN — CLINDAMYCIN PHOSPHATE 600 MG: 600 INJECTION, SOLUTION INTRAVENOUS at 05:11

## 2019-07-30 RX ADMIN — KETOROLAC TROMETHAMINE 15 MG: 30 INJECTION, SOLUTION INTRAMUSCULAR at 01:45

## 2019-07-30 RX ADMIN — HEPARIN SODIUM 5000 UNITS: 5000 INJECTION INTRAVENOUS; SUBCUTANEOUS at 22:41

## 2019-07-30 RX ADMIN — HEPARIN SODIUM 5000 UNITS: 5000 INJECTION INTRAVENOUS; SUBCUTANEOUS at 05:11

## 2019-07-30 RX ADMIN — PANTOPRAZOLE SODIUM 40 MG: 40 TABLET, DELAYED RELEASE ORAL at 15:04

## 2019-07-30 RX ADMIN — KETOROLAC TROMETHAMINE 15 MG: 30 INJECTION, SOLUTION INTRAMUSCULAR at 16:40

## 2019-07-30 RX ADMIN — KETOROLAC TROMETHAMINE 15 MG: 30 INJECTION, SOLUTION INTRAMUSCULAR at 09:21

## 2019-07-30 RX ADMIN — SODIUM CHLORIDE, SODIUM LACTATE, POTASSIUM CHLORIDE, AND CALCIUM CHLORIDE 75 ML/HR: .6; .31; .03; .02 INJECTION, SOLUTION INTRAVENOUS at 16:42

## 2019-07-30 NOTE — PROGRESS NOTES
Progress Note - General Surgery   Kamran Palumbo 43 y o  female MRN: [id number]  Unit/Bed#: [address] [place] [address] Encounter: [id number]    Assessment:  1) POD #1 s/p lap meg - AVSS, bilirubin within normal limits, pain relatively well controlled with PCA, currently hemoglobin stable, electrolytes within normal limits, tolerating clear liquids without any nausea or vomiting    Plan:  1) POD #1 s/p lap meg -  - advance to full liquids  - continue DVT prophylaxis with heparin  - continue PCA  - will talk with attending and discontinue IV antibiotics  - continue SCDs  - ambulate as tolerated  - incentive spirometry  - re-evaluated tomorrow with routine abdominal exam  - repeat CBC, BMP, Mag, phos  - p r n  Zofran    Subjective/Objective   Chief Complaint:  I am still having a little pain    Subjective:  Patient was seen examined at bedside  Patient denies any acute events overnight  Patient denies any nausea or vomiting  Patient tolerated clear liquids well  Patient is not passing significant amount of gas  Patient has not been up and out of bed and ambulating routinely  Patient just ir getting up and moving  Patient is noted that she has decent pain control with PCA  Patient has increased appetite would like to try full liquids  Objective:     Blood pressure 124/81, pulse 91, temperature 98 4 °F (36 9 °C), resp  rate 18, height 5' 4" (1 626 m), weight 65 3 kg (144 lb), last menstrual period 06/26/2019, SpO2 100 %, not currently breastfeeding  ,Body mass index is 24 72 kg/m²        Intake/Output Summary (Last 24 hours) at 7/30/2019 1336  Last data filed at 7/30/2019 0901  Gross per 24 hour   Intake 1609 25 ml   Output 50 ml   Net 1559 25 ml       Invasive Devices     Peripheral Intravenous Line            Peripheral IV 01/19/19 Right Antecubital 192 days    Peripheral IV 07/29/19 Right Antecubital 1 day          Drain            Closed/Suction Drain Medial Abdomen -- days                Physical Exam: /81 Pulse 91   Temp 98 4 °F (36 9 °C)   Resp 18   Ht 5' 4" (1 626 m)   Wt 65 3 kg (144 lb)   LMP 06/26/2019 (Approximate)   SpO2 100%   BMI 24 72 kg/m²   General appearance: alert and oriented, in no acute distress  Head: Normocephalic, without obvious abnormality, atraumatic  Lungs: clear to auscultation bilaterally  Heart: regular rate and rhythm, S1, S2 normal, no murmur, click, rub or gallop  Abdomen: Soft, tenderness to palpation along the incision on the upper right quadrant, hypoactive bowel sounds, nondistended  Skin: Skin color, texture, turgor normal  No rashes or lesions or Incisions clean, dry, intact    Lab, Imaging and other studies:  I have personally reviewed pertinent lab results    , CBC:   Lab Results   Component Value Date    WBC 5 31 07/30/2019    HGB 8 0 (L) 07/30/2019    HCT 27 1 (L) 07/30/2019    MCV 80 (L) 07/30/2019     07/30/2019    MCH 23 5 (L) 07/30/2019    MCHC 29 5 (L) 07/30/2019    RDW 17 2 (H) 07/30/2019    MPV 12 1 07/30/2019    NRBC 0 07/30/2019   , CMP:   Lab Results   Component Value Date    SODIUM 137 07/30/2019    K 3 5 07/30/2019     07/30/2019    CO2 28 07/30/2019    BUN 12 07/30/2019    CREATININE 0 64 07/30/2019    CALCIUM 8 3 07/30/2019    AST 50 (H) 07/30/2019    ALT 48 07/30/2019    ALKPHOS 50 07/30/2019    EGFR 113 07/30/2019     VTE Pharmacologic Prophylaxis: Heparin  VTE Mechanical Prophylaxis: sequential compression device

## 2019-07-30 NOTE — UTILIZATION REVIEW
Initial Clinical Review    Elective surgical procedure    Age/Sex: 43 y o  female who presents  with right upper quadrant and back pain for few months duration of patient has a gastric bypass the surgery in past and has lost a considerable amount of weight  Workup for abdominal pain with the EGD barium study revealed the anastomotic ulcer and did include ultrasound which did reveal multiple gallstones with multiple dependent calduli and positive wall echo shadow sign  with  common bile duct  4mm      Patient advised ymptoms may be related to gastric bypass surgery which may not get better after removal of gallbladder but in  view of multiple stones she needs gallbladder surgery as she has gastric bypass and if she develops jaundice it will be very difficult to do ERCP    Surgery Date:       Anesthesia Start Date/Time: 07/29/19 1134   Procedure: ATTEMPTED LAPAROSCOPIC CHOLECYSTECTOMY CONVERTED TO OPEN CHOLECYSTECTOMY; LAPARASCOPIC LYSIS OF ADHESIONS, COMMON BILE DUCT EXPLORATION AND CHOLANGIOGRAM (N/A Abdomen) - Laparoscopic lysis of adhesion Converted to open procedure   Anesthesia type: general   Diagnosis:       Calculus of gallbladder without cholecystitis without obstruction [K80 20]      PUD (peptic ulcer disease) [K27 9]      Gastroesophageal reflux disease with esophagitis [K21 0]      Iron deficiency anemia secondary to inadequate dietary iron intake [D50 8]      Fragile X syndrome [Q99 2]      Ulcer at site of surgical anastomosis following bypass of stomach [K28 9]      S/P gastric bypass [Z98 84]   Pre-op diagnosis:       Calculus of gallbladder without cholecystitis without obstruction [K80 20]      PUD (peptic ulcer disease) [K27 9]      Gastroesophageal reflux disease with esophagitis [K21 0]      Iron deficiency anemia secondary to inadequate dietary iron intake [D50 8]      Fragile X syndrome [Q99 2]      Ulcer at site of surgical anastomosis following bypass of stomach [K28 9]      S/P gastric bypass [Z98 84]     Procedure(s):  CHOLECYSTECTOMY LAPAROSCOPIC     Operative Findings:  Large dilated J shaped gallbladder  Short dilated cystic duct with impacted stone in the cystic duct  Multiple adhesions over the gallbladder and between liver and abdominal wall    Estimated Blood Loss:  Minimal     Drains:  Round ERICK drain     Complications:  Nil         Minimal  Admission Orders: Date/Time/Statement: 7/29/19 @ 1707   Orders Placed This Encounter   Procedures    Inpatient Admission     Standing Status:   Standing     Number of Occurrences:   1     Order Specific Question:   Admitting Physician     Answer:   Teresa Frame     Order Specific Question:   Level of Care     Answer:   Med Surg [16]     Order Specific Question:   Estimated length of stay     Answer:   Inpatient Only Surgery     Vital Signs: /81   Pulse 91   Temp 98 4 °F (36 9 °C)   Resp 18   Ht 5' 4" (1 626 m)   Wt 65 3 kg (144 lb)   LMP 06/26/2019 (Approximate)   SpO2 100%   BMI 24 72 kg/m²      erick drain     Diet: clear liquid    Mobility: no mobility restrictions    DVT Prophylaxis:  Sq heparin - sequential compression device     Medications/Pain Control:   pca fentanyl   6/10        clindamycin 600 mg Intravenous Q8H   diphenhydrAMINE 25 mg Intravenous Q6H PRN   fentaNYL  Intravenous Continuous   heparin (porcine) 5,000 Units Subcutaneous Q8H [address]   ketorolac 15 mg Intravenous Q6H [address]   lactated ringers 75 mL/hr Intravenous Continuous   naloxone 0 04 mg Intravenous Q3 min PRN   ondansetron 4 mg Intravenous Q6H PRN       Network Utilization Review Department  Phone: [phone]; Fax [phone]  [email or web address]  ATTENTION: Please call with any questions or concerns to [phone]  and carefully listen to the prompts so that you are directed to the right person  Send all requests for admission clinical reviews, approved or denied determinations and any other requests to fax [phone]   All voicemails are confidential

## 2019-07-31 ENCOUNTER — ANESTHESIA (INPATIENT)
Dept: PERIOP | Facility: HOSPITAL | Age: 39
DRG: 409 | End: 2019-07-31
Payer: COMMERCIAL

## 2019-07-31 ENCOUNTER — ANESTHESIA EVENT (INPATIENT)
Dept: PERIOP | Facility: HOSPITAL | Age: 39
DRG: 409 | End: 2019-07-31
Payer: COMMERCIAL

## 2019-07-31 LAB — MRSA NOSE QL CULT: NORMAL

## 2019-07-31 PROCEDURE — 99024 POSTOP FOLLOW-UP VISIT: CPT | Performed by: SPECIALIST

## 2019-07-31 PROCEDURE — 0WPGX0Z REMOVAL OF DRAINAGE DEVICE FROM PERITONEAL CAVITY, EXTERNAL APPROACH: ICD-10-PCS | Performed by: SPECIALIST

## 2019-07-31 PROCEDURE — 10121 INC&RMVL FB SUBQ TISS COMP: CPT | Performed by: SPECIALIST

## 2019-07-31 PROCEDURE — NC001 PR NO CHARGE: Performed by: SURGERY

## 2019-07-31 RX ORDER — ONDANSETRON 2 MG/ML
4 INJECTION INTRAMUSCULAR; INTRAVENOUS ONCE AS NEEDED
Status: DISCONTINUED | OUTPATIENT
Start: 2019-07-31 | End: 2019-07-31 | Stop reason: HOSPADM

## 2019-07-31 RX ORDER — CLINDAMYCIN PHOSPHATE 600 MG/50ML
600 INJECTION INTRAVENOUS EVERY 8 HOURS
Status: CANCELLED | OUTPATIENT
Start: 2019-07-31

## 2019-07-31 RX ORDER — CLINDAMYCIN PHOSPHATE 600 MG/50ML
600 INJECTION INTRAVENOUS EVERY 8 HOURS
Status: DISCONTINUED | OUTPATIENT
Start: 2019-07-31 | End: 2019-08-01 | Stop reason: HOSPADM

## 2019-07-31 RX ORDER — HYDROCODONE BITARTRATE AND ACETAMINOPHEN 5; 325 MG/1; MG/1
1 TABLET ORAL EVERY 4 HOURS PRN
Status: DISCONTINUED | OUTPATIENT
Start: 2019-07-31 | End: 2019-08-01 | Stop reason: HOSPADM

## 2019-07-31 RX ORDER — PROPOFOL 10 MG/ML
INJECTION, EMULSION INTRAVENOUS AS NEEDED
Status: DISCONTINUED | OUTPATIENT
Start: 2019-07-31 | End: 2019-07-31 | Stop reason: SURG

## 2019-07-31 RX ORDER — CLINDAMYCIN PHOSPHATE 150 MG/ML
600 INJECTION, SOLUTION INTRAVENOUS EVERY 8 HOURS
Status: DISCONTINUED | OUTPATIENT
Start: 2019-07-31 | End: 2019-07-31

## 2019-07-31 RX ORDER — KETOROLAC TROMETHAMINE 30 MG/ML
15 INJECTION, SOLUTION INTRAMUSCULAR; INTRAVENOUS EVERY 6 HOURS SCHEDULED
Status: DISCONTINUED | OUTPATIENT
Start: 2019-07-31 | End: 2019-08-01 | Stop reason: HOSPADM

## 2019-07-31 RX ORDER — MIDAZOLAM HYDROCHLORIDE 1 MG/ML
INJECTION INTRAMUSCULAR; INTRAVENOUS AS NEEDED
Status: DISCONTINUED | OUTPATIENT
Start: 2019-07-31 | End: 2019-07-31 | Stop reason: SURG

## 2019-07-31 RX ORDER — FENTANYL CITRATE/PF 50 MCG/ML
25 SYRINGE (ML) INJECTION
Status: DISCONTINUED | OUTPATIENT
Start: 2019-07-31 | End: 2019-07-31 | Stop reason: HOSPADM

## 2019-07-31 RX ORDER — ACETAMINOPHEN 325 MG/1
650 TABLET ORAL EVERY 6 HOURS PRN
Status: DISCONTINUED | OUTPATIENT
Start: 2019-07-31 | End: 2019-08-01 | Stop reason: HOSPADM

## 2019-07-31 RX ORDER — KETAMINE HYDROCHLORIDE 50 MG/ML
INJECTION, SOLUTION, CONCENTRATE INTRAMUSCULAR; INTRAVENOUS AS NEEDED
Status: DISCONTINUED | OUTPATIENT
Start: 2019-07-31 | End: 2019-07-31 | Stop reason: SURG

## 2019-07-31 RX ORDER — CLINDAMYCIN PHOSPHATE 150 MG/ML
600 INJECTION, SOLUTION INTRAVENOUS EVERY 8 HOURS
Status: DISCONTINUED | OUTPATIENT
Start: 2019-07-31 | End: 2019-07-31 | Stop reason: CLARIF

## 2019-07-31 RX ORDER — FENTANYL CITRATE 50 UG/ML
INJECTION, SOLUTION INTRAMUSCULAR; INTRAVENOUS AS NEEDED
Status: DISCONTINUED | OUTPATIENT
Start: 2019-07-31 | End: 2019-07-31 | Stop reason: SURG

## 2019-07-31 RX ADMIN — MIDAZOLAM HYDROCHLORIDE 2 MG: 1 INJECTION, SOLUTION INTRAMUSCULAR; INTRAVENOUS at 17:36

## 2019-07-31 RX ADMIN — KETOROLAC TROMETHAMINE 15 MG: 30 INJECTION, SOLUTION INTRAMUSCULAR at 10:21

## 2019-07-31 RX ADMIN — HEPARIN SODIUM 5000 UNITS: 5000 INJECTION INTRAVENOUS; SUBCUTANEOUS at 05:39

## 2019-07-31 RX ADMIN — ONDANSETRON 4 MG: 2 INJECTION INTRAMUSCULAR; INTRAVENOUS at 12:14

## 2019-07-31 RX ADMIN — KETOROLAC TROMETHAMINE 15 MG: 30 INJECTION, SOLUTION INTRAMUSCULAR at 22:03

## 2019-07-31 RX ADMIN — PROPOFOL 50 MG: 10 INJECTION, EMULSION INTRAVENOUS at 17:37

## 2019-07-31 RX ADMIN — CLINDAMYCIN PHOSPHATE 600 MG: 600 INJECTION, SOLUTION INTRAVENOUS at 17:37

## 2019-07-31 RX ADMIN — ACETAMINOPHEN 650 MG: 325 TABLET, FILM COATED ORAL at 21:57

## 2019-07-31 RX ADMIN — SODIUM CHLORIDE, SODIUM LACTATE, POTASSIUM CHLORIDE, AND CALCIUM CHLORIDE 75 ML/HR: .6; .31; .03; .02 INJECTION, SOLUTION INTRAVENOUS at 05:39

## 2019-07-31 RX ADMIN — KETOROLAC TROMETHAMINE 15 MG: 30 INJECTION, SOLUTION INTRAMUSCULAR at 02:32

## 2019-07-31 RX ADMIN — PANTOPRAZOLE SODIUM 40 MG: 40 TABLET, DELAYED RELEASE ORAL at 05:39

## 2019-07-31 RX ADMIN — KETOROLAC TROMETHAMINE 15 MG: 30 INJECTION, SOLUTION INTRAMUSCULAR at 15:25

## 2019-07-31 RX ADMIN — FENTANYL CITRATE: 50 INJECTION, SOLUTION INTRAMUSCULAR; INTRAVENOUS at 11:22

## 2019-07-31 RX ADMIN — KETAMINE HYDROCHLORIDE 30 MG: 50 INJECTION INTRAMUSCULAR; INTRAVENOUS at 17:37

## 2019-07-31 RX ADMIN — FENTANYL CITRATE 50 MCG: 50 INJECTION, SOLUTION INTRAMUSCULAR; INTRAVENOUS at 17:37

## 2019-07-31 RX ADMIN — ACETAMINOPHEN 650 MG: 325 TABLET, FILM COATED ORAL at 06:17

## 2019-07-31 NOTE — OP NOTE
General SurgeryRE-EXPLORATION OPERATIVE SITE Op Note    Roland Silva  7/31/2019    Pre-op Diagnosis:   Calculus of gallbladder without cholecystitis without obstruction [K80 20]  PMH:  Past Medical History:   Diagnosis Date    Anemia     Bipolar 2 disorder ([place] [address] )     Genital herpes in women     GERD (gastroesophageal reflux disease)     Herpes simplex infection     History of transfusion     x2       Post-op Diagnosis:  Post-Op Diagnosis Codes:     * Calculus of gallbladder without cholecystitis without obstruction [K80 20]    Patient Active Problem List   Diagnosis    Anemia    PUD (peptic ulcer disease)    Bipolar disorder ([place] [address] )    Chronic bilateral low back pain without sciatica    Esophageal candidiasis ([place] [address] )    Fragile X syndrome    Ulcer at site of surgical anastomosis following bypass of stomach    Dyspepsia    Dysphagia    Gastroesophageal reflux disease    Screening for cardiovascular, respiratory, and genitourinary diseases    Screening for diabetes mellitus (DM)    S/P gastric bypass    Gallstones without obstruction of gallbladder    Calculus of gallbladder without cholecystitis without obstruction    Gastroesophageal reflux disease with esophagitis    Iron deficiency anemia secondary to inadequate dietary iron intake       Procedure(s):  RE-EXPLORATION OPERATIVE SITE    Surgeon(s):  Sherryle Cipro, MD    Anesthesia:  General    Staff:   Scrub Person: [id number]    Assistant:  nil    Operative Findings:  Drain kinked at the site of insertion    OPERATIVE TECHNIQUE    Roland Silva was identified by me  Proper detailed consent was obtained from patient, The risks, benefits, alternatives,and probabilities of success were discussed in detail with no guarantee made as to outcome  All questions were answered to the patient's satisfaction  risk and benefits were explained to patient and family in detail prior to coming to Operating Room  Site was marked        Roland Silva was given pre-operative antibiotics  Body mass index is 24 72 kg/m²  Kaylin Sanderson is ASA 2 and Fire risk- 1  Kaylin Sanderson was re-identified in the OR  Site was identified and detailed timeout was performed with Anesthesia and OR staff  Kaylin Sanderson was placed in supine position  Operative area was exposed and painted with the Betadine prepped and draped in the usual sterile fashion  Drained stitch was cut  Drain was already out the 2/3 all the holes were visible do traction was given  And drained slid out without difficulty  Inspected the drain was intact      Kaylin Sanderson tolerated the procedure well, remain stable during and after the procedure  At the end of the procedure No active bleeding was noted and all the instruments, needle and sponge counts were noted to be correct  Sterile dresing was applied and patient was transfered to recovery area in stable condition  I was present for the entire procedure No qualified resident was available  Estimated Blood Loss:  Minimal    Specimens:  Drained close suction      Drains:  Closed/Suction Drain Medial Abdomen (Active)   Site Description Unable to view 7/31/2019  7:01 AM   Dressing Status Clean;Dry; Intact 7/31/2019  7:01 AM   Drainage Appearance Serosanguineous 7/31/2019  7:01 AM   Status To bulb suction 7/31/2019  7:01 AM   Output (mL) 10 mL 7/31/2019  4:66 AM       Complications:  None    Total OR Time  0 Hr 11 Min 2 Sec   Procedure  Time  Event Time In Time Out   Procedure Start 07/31/2019 1739    Procedure Closing     Procedure Finish 07/31/2019 554 Rasheed Casey MD [facility]  [address]:  [email or web address] [facility]  [facility]  [address]  Office - [phone]  Fax - [phone]    Date: 7/31/2019  Time: 5:49 PM    Copy to PCP: Amber Chavez DO

## 2019-07-31 NOTE — PLAN OF CARE
Patient is ambulating steady with an assist  Pain is being managed  Bowel sounds are more present today and patient is passing gas  Advanced to full liquid diet

## 2019-07-31 NOTE — ANESTHESIA PREPROCEDURE EVALUATION
Review of Systems/Medical History  Patient summary reviewed  Chart reviewed  No history of anesthetic complications     Cardiovascular  Exercise tolerance (METS): >4,     Pulmonary       GI/Hepatic    PUD, GERD ,   Comment: S/P gastric bypass          Endo/Other    Comment: Fragile X syndrome    GYN       Hematology  Anemia iron deficiency anemia,     Musculoskeletal       Neurology   Psychology   Depression , bipolar disorder,              Physical Exam    Airway    Mallampati score: II  TM Distance: >3 FB  Neck ROM: full     Dental   No notable dental hx     Cardiovascular  Cardiovascular exam normal    Pulmonary  Pulmonary exam normal     Other Findings        Anesthesia Plan  ASA Score- 2     Anesthesia Type-   Additional Monitors:   Airway Plan:     Comment: S/P open meg  Sindi SHAYLEE drain        Plan Factors-    Induction-     Postoperative Plan-     Informed Consent-

## 2019-07-31 NOTE — PLAN OF CARE
Problem: Potential for Falls  Goal: Patient will remain free of falls  Description  INTERVENTIONS:  - Assess patient frequently for physical needs  -  Identify cognitive and physical deficits and behaviors that affect risk of falls  -  [place] fall precautions as indicated by assessment   - Educate patient/family on patient safety including physical limitations  - Instruct patient to call for assistance with activity based on assessment  - Modify environment to reduce risk of injury  - Consider OT/PT consult to assist with strengthening/mobility  Outcome: Progressing     Problem: PAIN - ADULT  Goal: Verbalizes/displays adequate comfort level or baseline comfort level  Description  Interventions:  - Encourage patient to monitor pain and request assistance  - Assess pain using appropriate pain scale  - Administer analgesics based on type and severity of pain and evaluate response  - Implement non-pharmacological measures as appropriate and evaluate response  - Consider cultural and social influences on pain and pain management  - Notify physician/advanced practitioner if interventions unsuccessful or patient reports new pain  Outcome: Progressing     Problem: SAFETY ADULT  Goal: Patient will remain free of falls  Description  INTERVENTIONS:  - Assess patient frequently for physical needs  -  Identify cognitive and physical deficits and behaviors that affect risk of falls    -  [place] fall precautions as indicated by assessment   - Educate patient/family on patient safety including physical limitations  - Instruct patient to call for assistance with activity based on assessment  - Modify environment to reduce risk of injury  - Consider OT/PT consult to assist with strengthening/mobility  Outcome: Progressing     Problem: GASTROINTESTINAL - ADULT  Goal: Minimal or absence of nausea and/or vomiting  Description  INTERVENTIONS:  - Administer IV fluids as ordered to ensure adequate hydration  - Maintain NPO status until nausea and vomiting are resolved  - Nasogastric tube as ordered  - Administer ordered antiemetic medications as needed  - Provide nonpharmacologic comfort measures as appropriate  - Advance diet as tolerated, if ordered  - Nutrition services referral to assist patient with adequate nutrition and appropriate food choices  Outcome: Progressing  Goal: Maintains or returns to baseline bowel function  Description  INTERVENTIONS:  - Assess bowel function  - Encourage oral fluids to ensure adequate hydration  - Administer IV fluids as ordered to ensure adequate hydration  - Administer ordered medications as needed  - Encourage mobilization and activity  - Nutrition services referral to assist patient with appropriate food choices  Outcome: Progressing  Goal: Maintains adequate nutritional intake  Description  INTERVENTIONS:  - Monitor percentage of each meal consumed  - Identify factors contributing to decreased intake, treat as appropriate  - Assist with meals as needed  - Monitor I&O, WT and lab values  - Obtain nutrition services referral as needed  Outcome: Progressing  Goal: Establish and maintain optimal ostomy function  Description  INTERVENTIONS:  - Assess bowel function  - Encourage oral fluids to ensure adequate hydration  - Administer IV fluids as ordered to ensure adequate hydration  - Administer ordered medications as needed  - Encourage mobilization and activity  - Nutrition services referral to assist patient with appropriate food choices  - Assess stoma site  Outcome: Progressing     Problem: SKIN/TISSUE INTEGRITY - ADULT  Goal: Skin integrity remains intact  Description  INTERVENTIONS  - Identify patients at risk for skin breakdown  - Assess and monitor skin integrity  - Assess and monitor nutrition and hydration status  - Monitor labs (i e  albumin)  - Assess for incontinence   - Turn and reposition patient  - Assist with mobility/ambulation  - Relieve pressure over bony prominences  - Avoid friction and shearing  - Provide appropriate hygiene as needed including keeping skin clean and dry  - Evaluate need for skin moisturizer/barrier cream  - Collaborate with interdisciplinary team (i e  Nutrition, Rehabilitation, etc )   - Patient/family teaching  Outcome: Progressing  Goal: Incision(s), wounds(s) or drain site(s) healing without S/S of infection  Description  INTERVENTIONS  - Assess and document risk factors for skin impairment   - Assess and document dressing, incision, wound bed, drain sites and surrounding tissue  - Initiate Nutrition services consult and/or wound management as needed  Outcome: Progressing  Goal: Oral mucous membranes remain intact  Description  INTERVENTIONS  - Assess oral mucosa and hygiene practices  - Implement preventative oral hygiene regimen  - Implement oral medicated treatments as ordered  - Initiate Nutrition services referral as needed  Outcome: Progressing

## 2019-07-31 NOTE — PROGRESS NOTES
Progress Note - General Surgery   Merline Reedy 43 y o  female MRN: [id number]  Unit/Bed#: 2 [place] [address] Encounter: [id number]    Assessment:  1) POD #2 s/p open meg - AVSS, tolerating full liquids, pain relatively well controlled, hemoglobin stable, no nausea or vomiting, unfortunately SHAYLEE drain has significant resistance when attempting to remove    Plan:  1) POD #2 s/p open meg -   -  case request for exploration of SHAYLEE drain  - spoken coordinated with OR not anesthesia team  - hold DVT prophylaxis  - NPO  - continue PCA  - continue IV fluids at 75 mL/hour  - monitor I/Os  - doctors to you to obtain consent    Subjective/Objective   Chief Complaint:  I am doing well    Subjective:  Patient with you examined at bedside  Patient denies any acute events overnight  Patient denies any nausea or vomiting  Patient was tolerating full liquids  Patient had pain well controlled with fentanyl PCA  Patient is okay with being transition to p o  Oxycodone  Patient had breakfast this morning  Patient reports that her recorded allergy to oxycodone is not severe nor confirmed  Patient unfortunately had attempted drain removal but could not be removed  Patient was educated on the fact that we might need to do an exploration of SHAYLEE drain site in order to remove it  Objective:     Blood pressure 109/66, pulse 76, temperature 97 9 °F (36 6 °C), temperature source Oral, resp  rate 16, height 5' 4" (1 626 m), weight 65 3 kg (144 lb), last menstrual period 06/26/2019, SpO2 98 %, not currently breastfeeding  ,Body mass index is 24 72 kg/m²        Intake/Output Summary (Last 24 hours) at 7/31/2019 1039  Last data filed at 7/31/2019 0601  Gross per 24 hour   Intake 2485 75 ml   Output 350 ml   Net 2135 75 ml       Invasive Devices     Peripheral Intravenous Line            Peripheral IV 01/19/19 Right Antecubital 193 days    Peripheral IV 07/29/19 Right Antecubital 2 days          Drain            Closed/Suction Drain Medial Abdomen -- days                Physical Exam: /66 (BP Location: Left arm)   Pulse 76   Temp 97 9 °F (36 6 °C) (Oral)   Resp 16   Ht 5' 4" (1 626 m)   Wt 65 3 kg (144 lb)   LMP 06/26/2019 (Approximate)   SpO2 98%   BMI 24 72 kg/m²   General appearance: alert and oriented, in no acute distress  Head: Normocephalic, without obvious abnormality, atraumatic  Lungs: clear to auscultation bilaterally  Heart: regular rate and rhythm, S1, S2 normal, no murmur, click, rub or gallop  Abdomen: Tenderness along only open incision, no distention, no erythema, tolerating well, normoactive bowel sounds  Skin: Skin color, texture, turgor normal  No rashes or lesions or Incisions are clean, dry, intact    Lab, Imaging and other studies:I have personally reviewed pertinent lab results    ,  VTE Pharmacologic Prophylaxis: held for operation  VTE Mechanical Prophylaxis: sequential compression device

## 2019-07-31 NOTE — PROGRESS NOTES
Seen by Dr Jayla Momin    Having difficulty in removal of drain    Care was discussed with patient  /66 (BP Location: Left arm)   Pulse 76   Temp 97 9 °F (36 6 °C) (Oral)   Resp 16   Ht 5' 4" (1 626 m)   Wt 65 3 kg (144 lb)   LMP 06/26/2019 (Approximate)   SpO2 98%   BMI 24 72 kg/m²     Plan     removal of drain under IV sedation  In OR    Probably drain is kinked in side    OR call  Proper consent obtained  Antibiotics given

## 2019-08-01 ENCOUNTER — TRANSITIONAL CARE MANAGEMENT (OUTPATIENT)
Dept: FAMILY MEDICINE CLINIC | Facility: CLINIC | Age: 39
End: 2019-08-01

## 2019-08-01 VITALS
DIASTOLIC BLOOD PRESSURE: 80 MMHG | HEIGHT: 64 IN | OXYGEN SATURATION: 98 % | SYSTOLIC BLOOD PRESSURE: 122 MMHG | WEIGHT: 144 LBS | BODY MASS INDEX: 24.59 KG/M2 | HEART RATE: 74 BPM | TEMPERATURE: 98.2 F | RESPIRATION RATE: 18 BRPM

## 2019-08-01 DIAGNOSIS — Z71.89 COMPLEX CARE COORDINATION: Primary | ICD-10-CM

## 2019-08-01 PROCEDURE — 99024 POSTOP FOLLOW-UP VISIT: CPT | Performed by: SPECIALIST

## 2019-08-01 RX ORDER — ONDANSETRON 4 MG/1
4 TABLET, FILM COATED ORAL EVERY 8 HOURS PRN
Qty: 10 TABLET | Refills: 0 | Status: SHIPPED | OUTPATIENT
Start: 2019-08-01 | End: 2020-01-10

## 2019-08-01 RX ADMIN — KETOROLAC TROMETHAMINE 15 MG: 30 INJECTION, SOLUTION INTRAMUSCULAR at 10:55

## 2019-08-01 RX ADMIN — KETOROLAC TROMETHAMINE 15 MG: 30 INJECTION, SOLUTION INTRAMUSCULAR at 04:01

## 2019-08-01 RX ADMIN — HYDROCODONE BITARTRATE AND ACETAMINOPHEN 1 TABLET: 5; 325 TABLET ORAL at 02:05

## 2019-08-01 RX ADMIN — CLINDAMYCIN PHOSPHATE 600 MG: 600 INJECTION, SOLUTION INTRAVENOUS at 02:07

## 2019-08-01 RX ADMIN — PANTOPRAZOLE SODIUM 40 MG: 40 TABLET, DELAYED RELEASE ORAL at 06:20

## 2019-08-01 RX ADMIN — ONDANSETRON 4 MG: 2 INJECTION INTRAMUSCULAR; INTRAVENOUS at 08:08

## 2019-08-01 NOTE — DISCHARGE INSTR - AVS FIRST PAGE
Post-Operative Care Instructions      1  General: You may feel pulling sensations around the wound or funny aches and pains around the incisions  This is normal  Even minor surgery is a change in your body and this is your body's reaction to it  If you have had abdominal surgery, it may help to support the incision with a small pillow or blanket for comfort when moving or coughing  2  Wound care: The glue over the incisions will fall off over the next week or two  If you have staples or stitches, they will be removed by the physician at your follow up appointment  3  Showering: You may shower  Do not soak wound in a bath, hot tub, pool, lake, etc  Do not scrub or use exfoliants on the surgical wounds  4  Activity: You may go up and down stairs, walk as much as you are comfortable, but walk at least 3 times each day  If you have had abdominal surgery, do not perform any strenuous exercise or lift anything heavier than 10-15 pounds for at least 3 weeks, unless cleared by your physician  5  Diet: You may resume your regular diet  6  Medications: Resume all of your previous medications, unless told otherwise by the doctor  A good option for pain control is to start with acetaminophen(Tylenol) 650mg every 6 hours  You mas also try an Ice pack as directed  7  Driving: You will need someone to drive you home on the day of surgery  Do not drive or make any important decisions while on narcotic pain medication or for up to 24 hours after anesthesia for surgery  Generally, you may drive when you're off all narcotic pain medications  8  Upset Stomach: You may take Maalox, Tums, or similar items for an upset stomach  If your narcotic pain medication causes an upset stomach, do not take it on an empty stomach  Try taking it with at least some crackers or toast      9  Constipation: Patients often experienced constipation after surgery   You may take over-the-counter medication for this, such as Metamucil, Senokot, Colace, milk of magnesia, etc  If you experience significant nausea or vomiting after abdominal surgery, call the office before trying any of these medications  10  Call the office: If you are experiencing any of the following: fevers above 101 5°, significant nausea or vomiting, if the wound develops drainage and/or excessive redness around the wound, or if you have significant diarrhea or other worsening symptoms  11  Pain: You may be given a prescription for pain medication  This should be given to you upon discharge from the hospital     12  Follow up appointment:  Call office [phone] and schedule an appointment for 10 days after discharge

## 2019-08-01 NOTE — DISCHARGE SUMMARY
Discharge Summary - Leonard Cardozo 43 y o  female MRN: [id number]    Unit/Bed#: 2 [place] [address] Encounter: [id number]    Admission Date:   Admission Orders (From admission, onward)     Ordered        07/29/19 1707  Inpatient Admission  Once                     Admitting Diagnosis: Calculus of gallbladder without cholecystitis without obstruction [K80 20]  PUD (peptic ulcer disease) [K27 9]  Gastroesophageal reflux disease with esophagitis [K21 0]  Iron deficiency anemia secondary to inadequate dietary iron intake [D50 8]  Fragile X syndrome [Q99 2]  Ulcer at site of surgical anastomosis following bypass of stomach [K28 9]  S/P gastric bypass [Z98 84]    Per Dr Lexis Costa 7/26/19  HPI: Leonard Cardozo is a 43 y o  female who presents to office with right upper quadrant and back pain for few months duration of patient has a gastric bypass the surgery in past and has lost a considerable amount of weight  Workup for abdominal pain with the EGD barium study revealed the anastomotic ulcer workup did include ultrasound which did reveal multiple gallstones with the nose Large normal common bile duct     Patient was referred to us for possible laparoscopic cholecystectomy     Last the pain was the few weeks ago  Gastric bypass surgery 2004 lost 200 lb and gain 100 lb and then lost again  Extensive history of a anemia    Procedures Performed:  Open cholecystectomy    Summary of Hospital Course:  Patient was having some degree of biliary colic with ultrasound positive for multiple gallstones  Patient had lost a significant amount of late due to bariatric procedure  Patient as a result was planned for elective laparoscopic cholecystectomy  Patient has attempted laparoscopic cholecystectomy was converted open for safety purposes based on the anatomical variations of the CBD and significant amount of stones in the cystic duct    Patient has result was admitted under the general surgery service for routine advancement of diet, pain control, abdominal exams, drain management  Patient had labs within normal limits, AVSS, pain well controlled with PCA, appropriate outputs  Patient had diet advanced and was doing very well by postoperative day 2 was being planned for discharge  Patient when attempting to removed SHAYLEE drain had significant resistance and therefore is brought to the OR for removal of SHAYLEE drain in a controlled environment  There was a suspicion that possibly the SHAYLEE drain had been sutured into the abdomen  Patient did not have any acute or immediate complications of either procedure  Patient's SHAYLEE drain was able to be removed without any significant issues  Patient was kept overnight due to the time of the procedure  Patient was tolerating regular diet, had pain relatively well controlled, and was ready for discharge postoperative day 3  Significant Findings, Care, Treatment and Services Provided: cholelithiasis    Complications: none    Discharge Diagnosis: symptomatic cholelithiasis    Resolved Problems  Date Reviewed: 8/1/2019    None          Condition at Discharge: good         Discharge instructions/Information to patient and family:   See after visit summary for information provided to patient and family  Provisions for Follow-Up Care:  See after visit summary for information related to follow-up care and any pertinent home health orders  PCP: Bari Pryor DO    Disposition: Home    Planned Readmission: No    Discharge Statement   I spent 15 minutes discharging the patient  This time was spent on the day of discharge  I had direct contact with the patient on the day of discharge  Additional documentation is required if more than 30 minutes were spent on discharge  Discharge Medications:  See after visit summary for reconciled discharge medications provided to patient and family

## 2019-08-01 NOTE — DISCHARGE INSTRUCTIONS
Please follow carefully all instructions checked below  Rodney Flaming  Pre-op Diagnosis:   Calculus of gallbladder without cholecystitis without obstruction [K80 20]  Post-op Diagnosis:  * No post-op diagnosis entered *   Post-Op Diagnosis Codes:     * Calculus of gallbladder without cholecystitis without obstruction [K80 20]  Procedure(s):  REMOVAL OF SHAYLEE DRAIN  Surgeon(s):  Maria Teresa Mohan MD    1  Diet:  · Resume your normal diet  Avoid red meat eat lots of yogurt  2  Medications:  · Home medications reviewed  Resume pre-operative medications unless noted below  · Prescription sent home with patient and Prescription sent over to pharmacy  · See after visit summary for reconciled discharge medications provided to patient and family  3  Activities:  · Do NOT drive or operate machinery for 7 days  · While taking pain medication, do not drive and be careful as you walk or climb stairs  · Limit your activities for 3 weeks  Do not engage in sports, heavy work or heavy  lifting until your physician gives you permission  4  Wound Care:  · Change dressings as necessary after 2 days  · Keep dressings dry  5  Special Instructions:  · Full weight bearing  6  Bathing:  · May shower after 1 days  7  When to Call:       Call if fever, Nausea, vomiting, Constipation not feeling well, or wound infection,      bleeding,reddness and excessive pain,  8  Follow-up Care:  · Make an appointment to see Maria Teresa Mohan MD  [address]  in 10 days for Follow up   Please Call Office  [phone] for appointment,   · Call if fever, Nausea, vomiting, Constipation not feeling well, or wound infection, bleeding,reddness and excessive pain,    Maria Teresa Mohan MD [address]:  [address]    [place]   Darion Osorio [address]  Office - [phone]  Fax - [phone]  01/03/18  2:24 PM  Open Cholecystectomy   WHAT YOU NEED TO KNOW:   An open cholecystectomy is surgery to remove your gallbladder through an incision in your abdomen  DISCHARGE INSTRUCTIONS:   Medicines:   · Pain medicine: You may need medicine to take away or decrease pain  ¨ Learn how to take your medicine  Ask what medicine and how much you should take  Be sure you know how, when, and how often to take it  ¨ Do not wait until the pain is severe before you take your medicine  Tell caregivers if your pain does not decrease  ¨ Pain medicine can make you dizzy or sleepy  Prevent falls by calling someone when you get out of bed or if you need help  · Antibiotics: This medicine is given to fight or prevent an infection caused by bacteria  Always take your antibiotics exactly as ordered by your healthcare provider  Do not stop taking your medicine unless directed by your healthcare provider  Never save antibiotics or take leftover antibiotics that were given to you for another illness  · Take your medicine as directed  Contact your healthcare provider if you think your medicine is not helping or if you have side effects  Tell him or her if you are allergic to any medicine  Keep a list of the medicines, vitamins, and herbs you take  Include the amounts, and when and why you take them  Bring the list or the pill bottles to follow-up visits  Carry your medicine list with you in case of an emergency  Follow up with your healthcare provider as directed: You may need to return to have your stitches removed  Your healthcare provider will check your incision for signs of infection  If you have a drain, he will remove it  Write down your questions so you remember to ask them during your visits  Eat a variety of healthy foods: This may help you have more energy and heal faster  Healthy foods include fruit, vegetables, whole-grain breads, low-fat dairy products, beans, lean meat, and fish  Ask if you need to be on a special diet  Bathing with stitches:   Follow your healthcare provider's instructions on when you can bathe  Gently wash the part of your body that has the stitches  Do not rub on the stitches to dry your skin  Pat the area gently with a towel  When the area is dry, put on a clean, new bandage as directed  Contact your healthcare provider if:   · You have a fever  · You have nausea and vomiting  · You are constipated or urinate less than usual     · You have questions or concerns about your condition, surgery, or care  Seek care immediately or call 911 if:   · You feel full and cannot burp or vomit  · Blood soaks through your bandage  · Your incision is red, swollen, or has pus coming from it  · Your stitches come apart  · Your vomit is greenish, looks like coffee grounds, or has blood in it  · Your abdomen is severely painful and swollen  · Your arm or leg feels warm, tender, and painful  It may look swollen and red  · You feel lightheaded and have shortness of breath all of a sudden  · You have chest pain  You may have more pain when you take a deep breath or cough  You may cough up blood  © 2017 [facility] Information is for End User's use only and may not be sold, redistributed or otherwise used for commercial purposes  All illustrations and images included in CareNotes® are the copyrighted property of A D A M , Inc  or Filemon Murdock  The above information is an  only  It is not intended as medical advice for individual conditions or treatments  Talk to your doctor, nurse or pharmacist before following any medical regimen to see if it is safe and effective for you  Ondansetron (By mouth, Into the mouth)   Ondansetron (on-DAN-se-rachana)  Prevents nausea and vomiting  Brand Name(s): Zofran, Zofran ODT, Zuplenz   There may be other brand names for this medicine  When This Medicine Should Not Be Used: This medicine is not right for everyone   Do not use it if you had an allergic reaction to ondansetron  How to Use This Medicine: Thin Sheet, Liquid, Tablet, Dissolving Tablet  · Your doctor will tell you how much medicine to use  Do not use more than directed  · Measure the oral liquid medicine with a marked measuring spoon, oral syringe, or medicine cup  · To use the disintegrating tablet:   ¨ Do not open the blister pack that contains the tablet until you are ready to take it  ¨ Make sure your hands are dry  Peel back the foil, then remove the tablet from the blister pack  Do not push the tablet through the foil  ¨ Place the tablet on top of your tongue where it will dissolve in seconds  After the tablet has melted, swallow or take a sip of water  · To use the soluble film:   ¨ Make sure your hands are clean and dry  ¨ Fold the pouch along the dotted line  ¨ While still folded, tear the pouch carefully along the edge  Remove the film from the pouch  ¨ Place the film on top of your tongue  It will dissolve in 4 to 20 seconds  Do not chew or swallow the film whole  ¨ After the film has dissolved, you may swallow with or without water  · Read and follow the patient instructions that come with this medicine  Talk to your doctor or pharmacist if you have any questions  · Missed dose: Take a dose as soon as you remember  If it is almost time for your next dose, wait until then and take a regular dose  Do not take extra medicine to make up for a missed dose  · Store the medicine in a closed container at room temperature, away from heat, moisture, and direct light  Keep the soluble film in the foil pouch until you ready to use it  Drugs and Foods to Avoid:   Ask your doctor or pharmacist before using any other medicine, including over-the-counter medicines, vitamins, and herbal products  · Do not use this medicine together with apomorphine  · Some medicines may affect how ondansetron works   Tell your doctor if you are using tramadol, diuretics (water pills), or any other medicine for nausea and vomiting  Warnings While Using This Medicine:   · Tell your doctor if you are pregnant or breastfeeding, or if you have liver disease, congestive heart failure, heart rhythm problems (such as prolonged QT interval, slow heartbeat), low magnesium or potassium levels, stomach or bowel problems, or phenylketonuria (PKU)  · This medicine may cause heart rhythm problems (such as QT prolongation)  · This medicine may make you dizzy  Do not drive or do anything else that could be dangerous until you know how this medicine affects you  · Keep all medicine out of the reach of children  Never share your medicine with anyone  Possible Side Effects While Using This Medicine:   Call your doctor right away if you notice any of these side effects:  · Allergic reaction: Itching or hives, swelling in your face or hands, swelling or tingling in your mouth or throat, chest tightness, trouble breathing  · Fainting, dizziness, or lightheadedness  · Fast, pounding, or uneven heartbeat  · Trouble breathing  If you notice these less serious side effects, talk with your doctor:   · Constipation or diarrhea  · Headache  · Tiredness or weakness  If you notice other side effects that you think are caused by this medicine, tell your doctor  Call your doctor for medical advice about side effects  You may report side effects to FDA at 1-340-FDA-7643  © 2017 [address] Information is for End User's use only and may not be sold, redistributed or otherwise used for commercial purposes  The above information is an  only  It is not intended as medical advice for individual conditions or treatments  Talk to your doctor, nurse or pharmacist before following any medical regimen to see if it is safe and effective for you

## 2019-08-02 NOTE — UTILIZATION REVIEW
Notification of Discharge  This is a Notification of Discharge from our facility [address]  Please be advised that this patient has been discharge from our facility  Below you will find the admission and discharge date and time including the patients disposition  PRESENTATION DATE: 7/29/2019  9:25 AM  OBS ADMISSION DATE:   IP ADMISSION DATE: 7/29/19 1707   DISCHARGE DATE: 8/1/2019 11:25 AM  DISPOSITION: Home/Self Care Home/Self Care   Admission Orders listed below:  Admission Orders (From admission, onward)     Ordered        07/29/19 1707  Inpatient Admission  Once                   Please contact the UR Department if additional information is required to close this patient's authorization/case  [address]  Utilization Review Department  Phone: [phone]; Fax [phone]  [email or web address]  ATTENTION: Please call with any questions or concerns to [phone]  and carefully listen to the prompts so that you are directed to the right person  Send all requests for admission clinical reviews, approved or denied determinations and any other requests to fax [phone]   All voicemails are confidential

## 2019-08-06 ENCOUNTER — OFFICE VISIT (OUTPATIENT)
Dept: FAMILY MEDICINE CLINIC | Facility: CLINIC | Age: 39
End: 2019-08-06
Payer: COMMERCIAL

## 2019-08-06 VITALS
TEMPERATURE: 97.7 F | RESPIRATION RATE: 16 BRPM | WEIGHT: 147 LBS | DIASTOLIC BLOOD PRESSURE: 60 MMHG | SYSTOLIC BLOOD PRESSURE: 84 MMHG | BODY MASS INDEX: 25.1 KG/M2 | HEART RATE: 92 BPM | HEIGHT: 64 IN

## 2019-08-06 DIAGNOSIS — D50.8 IRON DEFICIENCY ANEMIA SECONDARY TO INADEQUATE DIETARY IRON INTAKE: Primary | ICD-10-CM

## 2019-08-06 DIAGNOSIS — Z98.84 S/P GASTRIC BYPASS: ICD-10-CM

## 2019-08-06 DIAGNOSIS — K21.00 GASTROESOPHAGEAL REFLUX DISEASE WITH ESOPHAGITIS: ICD-10-CM

## 2019-08-06 PROBLEM — K80.20 CALCULUS OF GALLBLADDER WITHOUT CHOLECYSTITIS WITHOUT OBSTRUCTION: Status: RESOLVED | Noted: 2019-07-26 | Resolved: 2019-08-06

## 2019-08-06 PROCEDURE — 99496 TRANSJ CARE MGMT HIGH F2F 7D: CPT | Performed by: FAMILY MEDICINE

## 2019-08-06 NOTE — PROGRESS NOTES
Assessment/Plan:    No problem-specific Assessment & Plan notes found for this encounter  If iron/cbc do not improve with po iron 3x/d, then would suggest hematology eval for venofer for malabsorption    Incision appears ok, some firmness locally  Bowel sounds normal  Wall tenderness with cough/sneeze/movt   Diagnoses and all orders for this visit:    Iron deficiency anemia secondary to inadequate dietary iron intake  -     CBC and differential; Future  -     Iron Saturation %; Future    Gastroesophageal reflux disease with esophagitis    S/P gastric bypass              Return if symptoms worsen or fail to improve  Subjective:       Patient ID: Mauricio Samuels is a 43 y o  female  Chief Complaint   Patient presents with    Transition of Care Management     [facility]       HPI  Out of work until 8/13  Multiple gallstones  Recovering  No f/c  No jaundice  No diarrhea  Still sore  6d post dc    VERONICA  On some iron  Not 3 per day  Heavy periods per pt  Hx of gastric bypass  Mother has VERONICA due to gastric bypass and also gets iv iron in [place]    The following portions of the patient's history were reviewed and updated as appropriate: allergies, current medications, past family history, past medical history, past social history, past surgical history and problem list     Review of Systems   Constitutional: Negative for chills and fever  Respiratory: Negative for shortness of breath  Current Outpatient Medications   Medication Sig Dispense Refill    pantoprazole (PROTONIX) 40 mg tablet Take 1 tablet (40 mg total) by mouth daily before breakfast 90 tablet 1    ondansetron (ZOFRAN) 4 mg tablet Take 1 tablet (4 mg total) by mouth every 8 (eight) hours as needed for nausea or vomiting for up to 3 days (Patient not taking: Reported on 8/6/2019) 10 tablet 0     No current facility-administered medications for this visit          Objective:    BP (!) 84/60   Pulse 92   Temp 97 7 °F (36 5 °C)   Resp 16   Ht 5' 4" (1 626 m)   Wt 66 7 kg (147 lb)   LMP 06/26/2019   BMI 25 23 kg/m²        Physical Exam   Constitutional: She appears well-developed  No distress  HENT:   Head: Normocephalic  Right Ear: External ear normal    Left Ear: External ear normal    Mouth/Throat: No oropharyngeal exudate  Eyes: Conjunctivae are normal    Neck: Neck supple  Cardiovascular: Normal rate, regular rhythm, normal heart sounds and intact distal pulses  No murmur heard  Pulmonary/Chest: Effort normal  No respiratory distress  She has no wheezes  She has no rales  Abdominal: Soft  Bowel sounds are normal  She exhibits no distension  There is no rebound  Musculoskeletal: She exhibits no edema or deformity  Neurological: She is alert  Skin: Skin is warm and dry  No rash noted  Psychiatric: Her behavior is normal  Thought content normal    Nursing note and vitals reviewed               Meghna Henderson DO

## 2019-08-13 ENCOUNTER — OFFICE VISIT (OUTPATIENT)
Dept: SURGERY | Facility: CLINIC | Age: 39
End: 2019-08-13

## 2019-08-13 VITALS
HEART RATE: 79 BPM | BODY MASS INDEX: 25.92 KG/M2 | HEIGHT: 64 IN | TEMPERATURE: 98.2 F | DIASTOLIC BLOOD PRESSURE: 64 MMHG | SYSTOLIC BLOOD PRESSURE: 104 MMHG | WEIGHT: 151.8 LBS

## 2019-08-13 DIAGNOSIS — Z98.84 S/P GASTRIC BYPASS: ICD-10-CM

## 2019-08-13 DIAGNOSIS — K80.20 GALLSTONES WITHOUT OBSTRUCTION OF GALLBLADDER: ICD-10-CM

## 2019-08-13 DIAGNOSIS — Z09 SURGICAL FOLLOW-UP CARE: ICD-10-CM

## 2019-08-13 DIAGNOSIS — K21.00 GASTROESOPHAGEAL REFLUX DISEASE WITH ESOPHAGITIS: Primary | ICD-10-CM

## 2019-08-13 PROCEDURE — 99024 POSTOP FOLLOW-UP VISIT: CPT | Performed by: SPECIALIST

## 2019-08-13 NOTE — PROGRESS NOTES
General Surgery Office Visit Follow up   [facility]  Patient: Tiffany Acosta   : 1980 Sex: female MRN: [id number]   CSN: [id number] PCP: Jia Brenner DO    Assessment/ Plan:  Tiffany Acosta is a 43 y o  female  day(s) POD # 15  S/p laparoscopic cholecystectomy converted to open cholecystectomy  With per operative cholangiogram for multiple gallstones and impacted gallstones in the cystic duct  Gastroesophageal reflux disease with esophagitis [K21 0]    Plan  Stable postop   Removal of staples  Follow-up in 3 weeks  Advised to increase the activity allowed to drive car   Return to work in 2 weeks  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  Mederma cream for the scar to fade     SUBJECTIVE:   Minimal pain over bending  Swelling over the umbilical area  OBJECTIVE:    Minimal incisional pain  No fever no chills no rigors  Tolerating p o   Diet well  Normal bowel movement no constipation or diarrhea  Ambulating well   Vitals:   /64 (BP Location: Left arm, Patient Position: Sitting, Cuff Size: Adult)   Pulse 79   Temp 98 2 °F (36 8 °C) (Tympanic)   Ht 5' 4" (1 626 m)   Wt 68 9 kg (151 lb 12 8 oz)   BMI 26 06 kg/m²     Active medications:    Current Outpatient Medications:     ondansetron (ZOFRAN) 4 mg tablet, Take 1 tablet (4 mg total) by mouth every 8 (eight) hours as needed for nausea or vomiting for up to 3 days (Patient not taking: Reported on 2019), Disp: 10 tablet, Rfl: 0    pantoprazole (PROTONIX) 40 mg tablet, Take 1 tablet (40 mg total) by mouth daily before breakfast, Disp: 90 tablet, Rfl: 1    Physical Exam:   General Alert awake   Normocephalic atraumatic PERRLA   Lungs clear bilaterally  Cardiac normal S1 normal S2  Abdomen soft,non tender Bowel sounds present  Skin: surgical dressing is C/D/I  Ext: No clubbing, cyanosis, edema  Surgical wound well healed    Visit Diagnosis:   Diagnoses and all orders for this visit:    Gastroesophageal reflux disease with esophagitis    Gallstones without obstruction of gallbladder    Surgical follow-up care    S/P gastric bypass       Plan of care was discussed with family And patient in detail    Pertinent labs reviewed  Most Recent Labs:   Admission on 07/29/2019, Discharged on 08/01/2019   Component Date Value Ref Range Status    WBC 07/29/2019 4 10* 4 31 - 10 16 Thousand/uL Final    RBC 07/29/2019 3 96  3 81 - 5 12 Million/uL Final    Hemoglobin 07/29/2019 9 5* 11 5 - 15 4 g/dL Final    Hematocrit 07/29/2019 32 2* 34 8 - 46 1 % Final    MCV 07/29/2019 81* 82 - 98 fL Final    MCH 07/29/2019 24 0* 26 8 - 34 3 pg Final    MCHC 07/29/2019 29 5* 31 4 - 37 4 g/dL Final    RDW 07/29/2019 17 2* 11 6 - 15 1 % Final    Platelets 55/84/8223 248  149 - 390 Thousands/uL Final    MPV 07/29/2019 12 6  8 9 - 12 7 fL Final    Sodium 07/29/2019 139  136 - 145 mmol/L Final    Potassium 07/29/2019 3 6  3 5 - 5 3 mmol/L Final    Chloride 07/29/2019 102  100 - 108 mmol/L Final    CO2 07/29/2019 28  21 - 32 mmol/L Final    ANION GAP 07/29/2019 9  4 - 13 mmol/L Final    BUN 07/29/2019 17  5 - 25 mg/dL Final    Creatinine 07/29/2019 0 71  0 60 - 1 30 mg/dL Final    Standardized to IDMS reference method    Glucose 07/29/2019 97  65 - 140 mg/dL Final      If the patient is fasting, the ADA then defines impaired fasting glucose as > 100 mg/dL and diabetes as > or equal to 123 mg/dL  Specimen collection should occur prior to Sulfasalazine administration due to the potential for falsely depressed results  Specimen collection should occur prior to Sulfapyridine administration due to the potential for falsely elevated results   Glucose, Fasting 07/29/2019 97  65 - 99 mg/dL Final      Specimen collection should occur prior to Sulfasalazine administration due to the potential for falsely depressed results   Specimen collection should occur prior to Sulfapyridine administration due to the potential for falsely elevated results   Calcium 07/29/2019 9 0  8 3 - 10 1 mg/dL Final    AST 07/29/2019 14  5 - 45 U/L Final      Specimen collection should occur prior to Sulfasalazine administration due to the potential for falsely depressed results   ALT 07/29/2019 19  12 - 78 U/L Final      Specimen collection should occur prior to Sulfasalazine administration due to the potential for falsely depressed results       Alkaline Phosphatase 07/29/2019 59  46 - 116 U/L Final    Total Protein 07/29/2019 7 6  6 4 - 8 2 g/dL Final    Albumin 07/29/2019 3 8  3 5 - 5 0 g/dL Final    Total Bilirubin 07/29/2019 0 40  0 20 - 1 00 mg/dL Final    eGFR 07/29/2019 108  ml/min/1 73sq m Final    Preg, Serum 07/29/2019 Negative  Negative Final    EXT Preg Test, Ur 07/29/2019 Negative  Negative Final    HCG 6499609 exp  2/28/2021    Control 07/29/2019 Valid  Valid Final    Ventricular Rate 07/29/2019 59  BPM Final    Atrial Rate 07/29/2019 59  BPM Final    WI Interval 07/29/2019 164  ms Final    QRSD Interval 07/29/2019 96  ms Final    QT Interval 07/29/2019 378  ms Final    QTC Interval 07/29/2019 374  ms Final    P Axis 07/29/2019 28  degrees Final    QRS Axis 07/29/2019 -1  degrees Final    T Wave Axis 07/29/2019 39  degrees Final    Case Report 07/29/2019    Final                    Value:Surgical Pathology Report                         Case: [id number]                                   Authorizing Provider:  Qamar Martinez MD          Collected:           07/29/2019 1219              Ordering Location:     [address] [place] [address] Received:            07/29/2019 [address] [place] [address]                                     Operating Room                                                               Pathologist:           Janett Saleh MD                                                              Specimen:    Gallbladder                                                                                Final Diagnosis 07/29/2019    Final                    Value: This result contains rich text formatting which cannot be displayed here   Note 07/29/2019    Final                    Value: This result contains rich text formatting which cannot be displayed here   Additional Information 07/29/2019    Final                    Value: This result contains rich text formatting which cannot be displayed here  Joshua Ascencio Description 07/29/2019    Final                    Value: This result contains rich text formatting which cannot be displayed here   MRSA Culture Only 07/29/2019 No Methicillin Resistant Staphlyococcus aureus (MRSA) isolated   Final    Sodium 07/30/2019 137  136 - 145 mmol/L Final    Potassium 07/30/2019 3 5  3 5 - 5 3 mmol/L Final    Chloride 07/30/2019 103  100 - 108 mmol/L Final    CO2 07/30/2019 28  21 - 32 mmol/L Final    ANION GAP 07/30/2019 6  4 - 13 mmol/L Final    BUN 07/30/2019 12  5 - 25 mg/dL Final    Creatinine 07/30/2019 0 64  0 60 - 1 30 mg/dL Final    Standardized to IDMS reference method    Glucose 07/30/2019 96  65 - 140 mg/dL Final      If the patient is fasting, the ADA then defines impaired fasting glucose as > 100 mg/dL and diabetes as > or equal to 123 mg/dL  Specimen collection should occur prior to Sulfasalazine administration due to the potential for falsely depressed results  Specimen collection should occur prior to Sulfapyridine administration due to the potential for falsely elevated results   Calcium 07/30/2019 8 3  8 3 - 10 1 mg/dL Final    AST 07/30/2019 50* 5 - 45 U/L Final      Specimen collection should occur prior to Sulfasalazine administration due to the potential for falsely depressed results   ALT 07/30/2019 48  12 - 78 U/L Final      Specimen collection should occur prior to Sulfasalazine administration due to the potential for falsely depressed results       Alkaline Phosphatase 07/30/2019 50  46 - 116 U/L Final    Total Protein 07/30/2019 6 2* 6 4 - 8 2 g/dL Final    Albumin 07/30/2019 3 0* 3 5 - 5 0 g/dL Final    Total Bilirubin 07/30/2019 0 40  0 20 - 1 00 mg/dL Final    eGFR 07/30/2019 113  ml/min/1 73sq m Final    Magnesium 07/30/2019 1 8  1 6 - 2 6 mg/dL Final    Phosphorus 07/30/2019 3 4  2 7 - 4 5 mg/dL Final    WBC 07/30/2019 5 31  4 31 - 10 16 Thousand/uL Final    RBC 07/30/2019 3 40* 3 81 - 5 12 Million/uL Final    Hemoglobin 07/30/2019 8 0* 11 5 - 15 4 g/dL Final    Hematocrit 07/30/2019 27 1* 34 8 - 46 1 % Final    MCV 07/30/2019 80* 82 - 98 fL Final    MCH 07/30/2019 23 5* 26 8 - 34 3 pg Final    MCHC 07/30/2019 29 5* 31 4 - 37 4 g/dL Final    RDW 07/30/2019 17 2* 11 6 - 15 1 % Final    MPV 07/30/2019 12 1  8 9 - 12 7 fL Final    Platelets 56/80/9660 193  149 - 390 Thousands/uL Final    nRBC 07/30/2019 0  /100 WBCs Final    Neutrophils Relative 07/30/2019 57  43 - 75 % Final    Immat GRANS % 07/30/2019 0  0 - 2 % Final    Lymphocytes Relative 07/30/2019 28  14 - 44 % Final    Monocytes Relative 07/30/2019 13* 4 - 12 % Final    Eosinophils Relative 07/30/2019 1  0 - 6 % Final    Basophils Relative 07/30/2019 1  0 - 1 % Final    Neutrophils Absolute 07/30/2019 3 06  1 85 - 7 62 Thousands/µL Final    Immature Grans Absolute 07/30/2019 0 02  0 00 - 0 20 Thousand/uL Final    Lymphocytes Absolute 07/30/2019 1 46  0 60 - 4 47 Thousands/µL Final    Monocytes Absolute 07/30/2019 0 70  0 17 - 1 22 Thousand/µL Final    Eosinophils Absolute 07/30/2019 0 04  0 00 - 0 61 Thousand/µL Final    Basophils Absolute 07/30/2019 0 03  0 00 - 0 10 Thousands/µL Final       Pertinent images and available reads personally reviewed  No results found  Pertinent notes reviewed  Pathology reports were reviewed with patient patient has the mucosal changes and metaplasia of gallbladder mucosa to intestinal  Counseling / Coordination of Care  Total Office time /unit time spent today 15minutes   Greater than 50% of total time was spent with the patient and / or family counseling and / or coordination of care  A description of the counseling / coordination of care:  I performed an interim history, pertinent images and labs, performed a physical examination to arrive at the plan delineated above with associated thought processes  Henrry Neri MD MS FRCS FACS  [facility]  08/13/19 2:56 PM        Portions of the record may have been created with voice recognition software  Occasional wrong word or "sound a like" substitutions may have occurred due to the inherent limitations of voice recognition software  Read the chart carefully and recognize, using context, where substitutions have occurred

## 2019-08-13 NOTE — LETTER
August 13, 2019     Patient: Jody Chaidez   YOB: 1980   Date of Visit: 8/13/2019       To Whom it May Concern:    Yoanna Colon is under my professional care  She was seen in my office on 8/13/2019  She may return to work on 8/27/2019 with no restrictions    If you have any questions or concerns, please don't hesitate to call           Sincerely,          Dudley Nunes MD        CC: No Recipients

## 2019-09-16 ENCOUNTER — OFFICE VISIT (OUTPATIENT)
Dept: FAMILY MEDICINE CLINIC | Facility: CLINIC | Age: 39
End: 2019-09-16
Payer: COMMERCIAL

## 2019-09-16 ENCOUNTER — HOSPITAL ENCOUNTER (OUTPATIENT)
Dept: RADIOLOGY | Facility: HOSPITAL | Age: 39
Discharge: HOME | End: 2019-09-16
Payer: COMMERCIAL

## 2019-09-16 ENCOUNTER — TELEPHONE (OUTPATIENT)
Dept: FAMILY MEDICINE CLINIC | Facility: CLINIC | Age: 39
End: 2019-09-16

## 2019-09-16 VITALS
SYSTOLIC BLOOD PRESSURE: 104 MMHG | BODY MASS INDEX: 24.75 KG/M2 | TEMPERATURE: 98.4 F | RESPIRATION RATE: 16 BRPM | WEIGHT: 145 LBS | DIASTOLIC BLOOD PRESSURE: 80 MMHG | HEART RATE: 84 BPM | HEIGHT: 64 IN

## 2019-09-16 DIAGNOSIS — R10.13 EPIGASTRIC PAIN: ICD-10-CM

## 2019-09-16 DIAGNOSIS — M54.9 MID BACK PAIN: ICD-10-CM

## 2019-09-16 DIAGNOSIS — M54.9 MID BACK PAIN: Primary | ICD-10-CM

## 2019-09-16 PROBLEM — R13.10 DYSPHAGIA: Status: RESOLVED | Noted: 2019-06-26 | Resolved: 2019-09-16

## 2019-09-16 PROCEDURE — 3008F BODY MASS INDEX DOCD: CPT | Performed by: NURSE PRACTITIONER

## 2019-09-16 PROCEDURE — 72072 X-RAY EXAM THORAC SPINE 3VWS: CPT

## 2019-09-16 PROCEDURE — 99213 OFFICE O/P EST LOW 20 MIN: CPT | Performed by: NURSE PRACTITIONER

## 2019-09-16 RX ORDER — SUCRALFATE 1 G/1
1 TABLET ORAL 3 TIMES DAILY
Qty: 45 TABLET | Refills: 0 | Status: SHIPPED | OUTPATIENT
Start: 2019-09-16 | End: 2019-10-11 | Stop reason: SDUPTHER

## 2019-09-16 NOTE — LETTER
September 16, 2019     Patient: Jaden Palacio   YOB: 1980   Date of Visit: 9/16/2019       To Whom it May Concern:    Pauline Maxwell is under my professional care  She was seen in my office on 9/16/2019  She may return to work on 09/18/2019  If you have any questions or concerns, please don't hesitate to call           Sincerely,          DENYS Burgess        CC: No Recipients

## 2019-09-16 NOTE — PROGRESS NOTES
Assessment/Plan:    1  Mid back pain  -     sucralfate (CARAFATE) 1 g tablet; Take 1 tablet (1 g total) by mouth 3 (three) times a day for 15 days  -     US abdominal aorta; Future; Expected date: 09/16/2019  -     XR spine thoracic 3 vw; Future; Expected date: 09/16/2019    2  Epigastric pain  -     sucralfate (CARAFATE) 1 g tablet; Take 1 tablet (1 g total) by mouth 3 (three) times a day for 15 days  -     US abdominal aorta; Future; Expected date: 09/16/2019  -     XR spine thoracic 3 vw; Future; Expected date: 09/16/2019          BMI Counseling: Body mass index is 24 89 kg/m²  Discussed the patient's BMI with her  Patient Instructions:  Supportive care discussed and advised  Advised to RTO for any worsening and no improvement  Follow up for no improvement and worsening of conditions  Patient advised and educated when to see immediate medical care  Return if symptoms worsen or fail to improve  No future appointments  Subjective:      Patient ID: Ximena Velásquez is a 43 y o  female  Chief Complaint   Patient presents with    Back Pain     Mid back pain radating into her chest since 2 am  She had her gallbladder out a month ago and has had this pain intermittently but today it is consistent  Afebrile JMoyleLPN         Vitals:  /80   Pulse 84   Temp 98 4 °F (36 9 °C)   Resp 16   Ht 5' 4" (1 626 m)   Wt 65 8 kg (145 lb)   BMI 24 89 kg/m²     HPI  Patient stated had cholecystectomy done about a month and half ago  Stated that about a month ago started with pain in epigastric region and mid back pain and going around like a band in the mid back  Denies any chest pain and sob  Denies any constipation and diarrhea  Stated that does not hurt on touching and feels pain is deeper             The following portions of the patient's history were reviewed and updated as appropriate: allergies, current medications, past family history, past medical history, past social history, past surgical history and problem list       Review of Systems   Constitutional: Negative for chills, diaphoresis, fatigue, fever and unexpected weight change  Respiratory: Negative  Cardiovascular: Negative  Gastrointestinal: Positive for abdominal pain  Negative for nausea and vomiting  Genitourinary: Negative for decreased urine volume, difficulty urinating, dysuria, flank pain, frequency, genital sores, hematuria and urgency  Musculoskeletal: Positive for back pain  Skin: Negative  Neurological: Negative for dizziness and headaches  Objective:    Social History     Tobacco Use   Smoking Status Never Smoker   Smokeless Tobacco Never Used       Allergies: Allergies   Allergen Reactions    Penicillins Anaphylaxis, Hives and Itching    Oxycodone-Acetaminophen Rash, Swelling and Vomiting         Current Outpatient Medications   Medication Sig Dispense Refill    pantoprazole (PROTONIX) 40 mg tablet Take 1 tablet (40 mg total) by mouth daily before breakfast 90 tablet 1    ondansetron (ZOFRAN) 4 mg tablet Take 1 tablet (4 mg total) by mouth every 8 (eight) hours as needed for nausea or vomiting for up to 3 days (Patient not taking: Reported on 8/6/2019) 10 tablet 0    sucralfate (CARAFATE) 1 g tablet Take 1 tablet (1 g total) by mouth 3 (three) times a day for 15 days 45 tablet 0     No current facility-administered medications for this visit  Physical Exam   Constitutional: She is oriented to person, place, and time  She appears well-developed and well-nourished  Cardiovascular: Normal rate, regular rhythm and normal heart sounds  Pulmonary/Chest: Effort normal and breath sounds normal    Abdominal: Soft  Bowel sounds are normal  There is no tenderness  There is no CVA tenderness  Musculoskeletal: She exhibits no edema or tenderness  Neurological: She is alert and oriented to person, place, and time  Skin: Skin is warm and dry     Psychiatric: She has a normal mood and affect  Her behavior is normal  Judgment and thought content normal    Vitals reviewed                    DENYS Goetz

## 2019-09-17 ENCOUNTER — TELEPHONE (OUTPATIENT)
Dept: FAMILY MEDICINE CLINIC | Facility: CLINIC | Age: 39
End: 2019-09-17

## 2019-09-17 ENCOUNTER — HOSPITAL ENCOUNTER (OUTPATIENT)
Dept: RADIOLOGY | Facility: HOSPITAL | Age: 39
Discharge: HOME | End: 2019-09-17
Payer: COMMERCIAL

## 2019-09-17 ENCOUNTER — TELEPHONE (OUTPATIENT)
Dept: SURGERY | Facility: CLINIC | Age: 39
End: 2019-09-17

## 2019-09-17 DIAGNOSIS — M54.9 MID BACK PAIN: ICD-10-CM

## 2019-09-17 DIAGNOSIS — R10.13 EPIGASTRIC PAIN: ICD-10-CM

## 2019-09-17 PROCEDURE — 76775 US EXAM ABDO BACK WALL LIM: CPT

## 2019-09-17 NOTE — TELEPHONE ENCOUNTER
Patient likely does not need a CT scan    Patient to be evaluated by Dr Abiola Herrera upon his return next week
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
## 2019-09-17 NOTE — TELEPHONE ENCOUNTER
Patient called back to check on her and stated that started Carafate yesterday and her pain is 95% gone and able to sleep  Stated that Dr Alondra Cuenca informed her that he removed the stones from her duct at the time of surgery with gall bladder  Has h/o gastric ulcer at surgical anastomosis from gastric bypass  Considering Carafate is helping, will follow up with GI and strict ER precautions advised   DENYS June

## 2019-09-18 NOTE — TELEPHONE ENCOUNTER
Patient called back to let me know that Shadia Clark, nurse practitioner at [facility] wrote rx for carafate and that has taken her pain away so she is going to follow up with GI   She will call us if there is any other surgical need Carmen Farley MA

## 2019-10-11 DIAGNOSIS — R10.13 EPIGASTRIC PAIN: ICD-10-CM

## 2019-10-11 DIAGNOSIS — M54.9 MID BACK PAIN: ICD-10-CM

## 2019-10-11 RX ORDER — SUCRALFATE 1 G/1
1 TABLET ORAL 3 TIMES DAILY
Qty: 45 TABLET | Refills: 0 | Status: SHIPPED | OUTPATIENT
Start: 2019-10-11 | End: 2020-01-06 | Stop reason: SDUPTHER

## 2020-01-06 DIAGNOSIS — M54.9 MID BACK PAIN: ICD-10-CM

## 2020-01-06 DIAGNOSIS — R10.13 EPIGASTRIC PAIN: ICD-10-CM

## 2020-01-06 RX ORDER — SUCRALFATE 1 G/1
1 TABLET ORAL 3 TIMES DAILY
Qty: 45 TABLET | Refills: 0 | Status: SHIPPED | OUTPATIENT
Start: 2020-01-06 | End: 2020-06-28 | Stop reason: SDUPTHER

## 2020-01-10 ENCOUNTER — APPOINTMENT (OUTPATIENT)
Dept: LAB | Facility: HOSPITAL | Age: 40
End: 2020-01-10
Attending: FAMILY MEDICINE
Payer: COMMERCIAL

## 2020-01-10 ENCOUNTER — TRANSCRIBE ORDERS (OUTPATIENT)
Dept: ADMINISTRATIVE | Facility: HOSPITAL | Age: 40
End: 2020-01-10

## 2020-01-10 ENCOUNTER — OFFICE VISIT (OUTPATIENT)
Dept: FAMILY MEDICINE CLINIC | Facility: CLINIC | Age: 40
End: 2020-01-10
Payer: COMMERCIAL

## 2020-01-10 VITALS
HEART RATE: 88 BPM | WEIGHT: 150 LBS | BODY MASS INDEX: 25.61 KG/M2 | SYSTOLIC BLOOD PRESSURE: 112 MMHG | HEIGHT: 64 IN | TEMPERATURE: 97.5 F | DIASTOLIC BLOOD PRESSURE: 62 MMHG | RESPIRATION RATE: 18 BRPM | OXYGEN SATURATION: 99 %

## 2020-01-10 DIAGNOSIS — K21.00 GASTROESOPHAGEAL REFLUX DISEASE WITH ESOPHAGITIS: ICD-10-CM

## 2020-01-10 DIAGNOSIS — Z00.00 HEALTHCARE MAINTENANCE: ICD-10-CM

## 2020-01-10 DIAGNOSIS — R10.13 EPIGASTRIC PAIN: ICD-10-CM

## 2020-01-10 DIAGNOSIS — R10.13 EPIGASTRIC PAIN: Primary | ICD-10-CM

## 2020-01-10 DIAGNOSIS — D50.8 IRON DEFICIENCY ANEMIA SECONDARY TO INADEQUATE DIETARY IRON INTAKE: ICD-10-CM

## 2020-01-10 DIAGNOSIS — Z12.39 BREAST CANCER SCREENING: ICD-10-CM

## 2020-01-10 LAB
ALBUMIN SERPL BCP-MCNC: 3.8 G/DL (ref 3.5–5)
ALP SERPL-CCNC: 60 U/L (ref 46–116)
ALT SERPL W P-5'-P-CCNC: 17 U/L (ref 12–78)
ANION GAP SERPL CALCULATED.3IONS-SCNC: 6 MMOL/L (ref 4–13)
AST SERPL W P-5'-P-CCNC: 16 U/L (ref 5–45)
BASOPHILS # BLD AUTO: 0.08 THOUSANDS/ΜL (ref 0–0.1)
BASOPHILS NFR BLD AUTO: 2 % (ref 0–1)
BILIRUB SERPL-MCNC: 0.2 MG/DL (ref 0.2–1)
BUN SERPL-MCNC: 27 MG/DL (ref 5–25)
CALCIUM SERPL-MCNC: 8.7 MG/DL (ref 8.3–10.1)
CHLORIDE SERPL-SCNC: 102 MMOL/L (ref 100–108)
CO2 SERPL-SCNC: 30 MMOL/L (ref 21–32)
CREAT SERPL-MCNC: 0.67 MG/DL (ref 0.6–1.3)
EOSINOPHIL # BLD AUTO: 0.14 THOUSAND/ΜL (ref 0–0.61)
EOSINOPHIL NFR BLD AUTO: 3 % (ref 0–6)
ERYTHROCYTE [DISTWIDTH] IN BLOOD BY AUTOMATED COUNT: 17.1 % (ref 11.6–15.1)
GFR SERPL CREATININE-BSD FRML MDRD: 110 ML/MIN/1.73SQ M
GLUCOSE P FAST SERPL-MCNC: 98 MG/DL (ref 65–99)
HCT VFR BLD AUTO: 28.9 % (ref 34.8–46.1)
HGB BLD-MCNC: 8.4 G/DL (ref 11.5–15.4)
IMM GRANULOCYTES # BLD AUTO: 0 THOUSAND/UL (ref 0–0.2)
IMM GRANULOCYTES NFR BLD AUTO: 0 % (ref 0–2)
IRON SATN MFR SERPL: 3 %
IRON SERPL-MCNC: 17 UG/DL (ref 50–170)
LYMPHOCYTES # BLD AUTO: 1.71 THOUSANDS/ΜL (ref 0.6–4.47)
LYMPHOCYTES NFR BLD AUTO: 35 % (ref 14–44)
MCH RBC QN AUTO: 23.7 PG (ref 26.8–34.3)
MCHC RBC AUTO-ENTMCNC: 29.1 G/DL (ref 31.4–37.4)
MCV RBC AUTO: 81 FL (ref 82–98)
MONOCYTES # BLD AUTO: 0.51 THOUSAND/ΜL (ref 0.17–1.22)
MONOCYTES NFR BLD AUTO: 10 % (ref 4–12)
NEUTROPHILS # BLD AUTO: 2.5 THOUSANDS/ΜL (ref 1.85–7.62)
NEUTS SEG NFR BLD AUTO: 50 % (ref 43–75)
NRBC BLD AUTO-RTO: 0 /100 WBCS
PLATELET # BLD AUTO: 252 THOUSANDS/UL (ref 149–390)
PMV BLD AUTO: 11.6 FL (ref 8.9–12.7)
POTASSIUM SERPL-SCNC: 3.6 MMOL/L (ref 3.5–5.3)
PROT SERPL-MCNC: 7.7 G/DL (ref 6.4–8.2)
RBC # BLD AUTO: 3.55 MILLION/UL (ref 3.81–5.12)
SODIUM SERPL-SCNC: 138 MMOL/L (ref 136–145)
TIBC SERPL-MCNC: 511 UG/DL (ref 250–450)
WBC # BLD AUTO: 4.94 THOUSAND/UL (ref 4.31–10.16)

## 2020-01-10 PROCEDURE — 99214 OFFICE O/P EST MOD 30 MIN: CPT | Performed by: FAMILY MEDICINE

## 2020-01-10 PROCEDURE — 83550 IRON BINDING TEST: CPT

## 2020-01-10 PROCEDURE — 36415 COLL VENOUS BLD VENIPUNCTURE: CPT

## 2020-01-10 PROCEDURE — 80053 COMPREHEN METABOLIC PANEL: CPT

## 2020-01-10 PROCEDURE — 85025 COMPLETE CBC W/AUTO DIFF WBC: CPT

## 2020-01-10 PROCEDURE — 83540 ASSAY OF IRON: CPT

## 2020-01-10 NOTE — PROGRESS NOTES
Assessment/Plan:    No problem-specific Assessment & Plan notes found for this encounter  Suspect gastritis but consider CT or UGI or EGD if no better  Start with protonix 40mg bid short term  Recent data suggesting increased risk of ischemic CVA and chronic kidney damage with PPI use was advised  Consider pelvic US if any lower abd symptoms since had a missed period recently  Offer preg test if misses period in future    mammo ordered  Her first  Might have dense breasts    Dark stool r/o ugi bleeding  Check cmp/cbc/iron now  If more anemic than baseline, might need gi eval/egd sooner, pt aware     Diagnoses and all orders for this visit:    Epigastric pain  -     Comprehensive metabolic panel; Future  -     CBC and differential; Future  -     Ambulatory referral to Gastroenterology; Future    Breast cancer screening  -     Mammo screening bilateral w cad; Future    Healthcare maintenance  -     Ambulatory referral to Obstetrics / Gynecology; Future    Iron deficiency anemia secondary to inadequate dietary iron intake  -     Iron Saturation %; Future    Gastroesophageal reflux disease with esophagitis          Return if symptoms worsen or fail to improve  Subjective:      Patient ID: Kelechi Henning is a 35 y o  female  Chief Complaint   Patient presents with    Abdominal Pain     has peptic ulcer  b[facility] lpn       HPI    No fam hx of breast cancer  No prior mammogram    Stomach pain  burrning  Lower abdomen  To flank  No period for 2m then had period, heavier  Not sure why  Discomfort after the period  Tried some carafate again with her protonix  Dark stool, almost black    The following portions of the patient's history were reviewed and updated as appropriate: allergies, current medications, past family history, past medical history, past social history, past surgical history and problem list     Review of Systems   Gastrointestinal: Positive for abdominal pain and blood in stool   Negative for abdominal distention and vomiting  Neurological: Negative for light-headedness  Current Outpatient Medications   Medication Sig Dispense Refill    pantoprazole (PROTONIX) 40 mg tablet Take 1 tablet (40 mg total) by mouth daily before breakfast 90 tablet 1    sucralfate (CARAFATE) 1 g tablet Take 1 tablet (1 g total) by mouth 3 (three) times a day for 15 days 45 tablet 0     No current facility-administered medications for this visit  Objective:    /62   Pulse 88   Temp 97 5 °F (36 4 °C)   Resp 18   Ht 5' 4" (1 626 m)   Wt 68 kg (150 lb)   LMP 12/31/2019 (Exact Date)   SpO2 99%   BMI 25 75 kg/m²        Physical Exam   Constitutional: She appears well-developed  No distress  HENT:   Head: Normocephalic  Right Ear: External ear normal    Left Ear: External ear normal    Mouth/Throat: No oropharyngeal exudate  Eyes: Conjunctivae are normal  No scleral icterus  Neck: Neck supple  Cardiovascular: Normal rate, regular rhythm, normal heart sounds and intact distal pulses  Pulmonary/Chest: Effort normal and breath sounds normal  No respiratory distress  Abdominal: Soft  Bowel sounds are normal  She exhibits no distension and no mass  There is tenderness  There is no rebound and no guarding  Upper mid abdomen discomfort w/o mass or rgr   Musculoskeletal: She exhibits no edema or deformity  Neurological: She is alert  She exhibits normal muscle tone  Skin: Skin is warm and dry  No rash noted  No pallor  Psychiatric: Her behavior is normal  Thought content normal    Nursing note and vitals reviewed               Sofyasilucia Moss DO

## 2020-01-10 NOTE — PATIENT INSTRUCTIONS
Please double your pantoprazole to 40mg twice a day for 1 week or until seen by the gastroenterologist

## 2020-01-10 NOTE — LETTER
January 10, 2020     Patient: Haily Pritchett   YOB: 1980   Date of Visit: 1/10/2020       To Whom it May Concern:    Evelyn Goldstein is under my professional care  She was seen in my office on 1/10/2020  Excuse from work on 1/10/20  If you have any questions or concerns, please don't hesitate to call           Sincerely,          Marlo Burleson DO        CC: No Recipients

## 2020-01-21 DIAGNOSIS — K21.9 GASTROESOPHAGEAL REFLUX DISEASE, ESOPHAGITIS PRESENCE NOT SPECIFIED: ICD-10-CM

## 2020-01-21 RX ORDER — PANTOPRAZOLE SODIUM 40 MG/1
40 TABLET, DELAYED RELEASE ORAL
Qty: 90 TABLET | Refills: 1 | Status: SHIPPED | OUTPATIENT
Start: 2020-01-21 | End: 2020-03-23 | Stop reason: SDUPTHER

## 2020-02-15 ENCOUNTER — HOSPITAL ENCOUNTER (EMERGENCY)
Facility: HOSPITAL | Age: 40
Discharge: HOME | End: 2020-02-15
Attending: EMERGENCY MEDICINE | Admitting: EMERGENCY MEDICINE
Payer: OTHER MISCELLANEOUS

## 2020-02-15 VITALS
SYSTOLIC BLOOD PRESSURE: 149 MMHG | RESPIRATION RATE: 20 BRPM | HEART RATE: 110 BPM | TEMPERATURE: 98 F | DIASTOLIC BLOOD PRESSURE: 88 MMHG | OXYGEN SATURATION: 100 %
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
## 2020-02-15 LAB
ALT SERPL W P-5'-P-CCNC: 18 U/L (ref 12–78)
HBV SURFACE AB SER-ACNC: <3.1 MIU/ML
HBV SURFACE AG SER QL: NORMAL
HCV AB SER QL: NORMAL

## 2020-02-15 PROCEDURE — 84460 ALANINE AMINO (ALT) (SGPT): CPT | Performed by: EMERGENCY MEDICINE

## 2020-02-15 PROCEDURE — 99283 EMERGENCY DEPT VISIT LOW MDM: CPT

## 2020-02-15 PROCEDURE — 87340 HEPATITIS B SURFACE AG IA: CPT | Performed by: EMERGENCY MEDICINE

## 2020-02-15 PROCEDURE — 86706 HEP B SURFACE ANTIBODY: CPT | Performed by: EMERGENCY MEDICINE

## 2020-02-15 PROCEDURE — 87389 HIV-1 AG W/HIV-1&-2 AB AG IA: CPT | Performed by: EMERGENCY MEDICINE

## 2020-02-15 PROCEDURE — 99282 EMERGENCY DEPT VISIT SF MDM: CPT | Performed by: EMERGENCY MEDICINE

## 2020-02-15 PROCEDURE — 36415 COLL VENOUS BLD VENIPUNCTURE: CPT | Performed by: EMERGENCY MEDICINE

## 2020-02-15 PROCEDURE — 86803 HEPATITIS C AB TEST: CPT | Performed by: EMERGENCY MEDICINE
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
## 2020-02-16 LAB — HIV 1+2 AB+HIV1 P24 AG SERPL QL IA: NORMAL

## 2020-03-23 DIAGNOSIS — K21.9 GASTROESOPHAGEAL REFLUX DISEASE, ESOPHAGITIS PRESENCE NOT SPECIFIED: ICD-10-CM

## 2020-03-23 RX ORDER — PANTOPRAZOLE SODIUM 40 MG/1
40 TABLET, DELAYED RELEASE ORAL
Qty: 90 TABLET | Refills: 1 | Status: SHIPPED | OUTPATIENT
Start: 2020-03-23 | End: 2020-06-28 | Stop reason: SDUPTHER

## 2020-06-23 ENCOUNTER — AMB VIDEO VISIT (OUTPATIENT)
Dept: OTHER | Facility: HOSPITAL | Age: 40
End: 2020-06-23
Payer: COMMERCIAL

## 2020-06-23 PROCEDURE — 99201 PR OFFICE OUTPATIENT NEW 10 MINUTES: CPT | Performed by: FAMILY MEDICINE

## 2020-06-28 DIAGNOSIS — R10.13 EPIGASTRIC PAIN: ICD-10-CM

## 2020-06-28 DIAGNOSIS — K21.9 GASTROESOPHAGEAL REFLUX DISEASE, ESOPHAGITIS PRESENCE NOT SPECIFIED: ICD-10-CM

## 2020-06-28 DIAGNOSIS — M54.9 MID BACK PAIN: ICD-10-CM

## 2020-06-29 RX ORDER — PANTOPRAZOLE SODIUM 40 MG/1
40 TABLET, DELAYED RELEASE ORAL
Qty: 90 TABLET | Refills: 0 | Status: SHIPPED | OUTPATIENT
Start: 2020-06-29 | End: 2021-01-07

## 2020-06-29 RX ORDER — SUCRALFATE 1 G/1
1 TABLET ORAL 3 TIMES DAILY
Qty: 45 TABLET | Refills: 0 | Status: SHIPPED | OUTPATIENT
Start: 2020-06-29 | End: 2021-01-07

## 2020-10-28 ENCOUNTER — AMB VIDEO VISIT (OUTPATIENT)
Dept: OTHER | Facility: HOSPITAL | Age: 40
End: 2020-10-28
Payer: COMMERCIAL

## 2020-10-28 PROCEDURE — 99212 OFFICE O/P EST SF 10 MIN: CPT | Performed by: FAMILY MEDICINE

## 2020-10-29 ENCOUNTER — TELEPHONE (OUTPATIENT)
Dept: FAMILY MEDICINE CLINIC | Facility: CLINIC | Age: 40
End: 2020-10-29

## 2020-11-17 ENCOUNTER — TELEPHONE (OUTPATIENT)
Dept: FAMILY MEDICINE CLINIC | Facility: CLINIC | Age: 40
End: 2020-11-17

## 2020-12-28 ENCOUNTER — IMMUNIZATIONS (OUTPATIENT)
Dept: FAMILY MEDICINE CLINIC | Facility: HOSPITAL | Age: 40
End: 2020-12-28
Payer: COMMERCIAL

## 2020-12-28 DIAGNOSIS — Z23 ENCOUNTER FOR IMMUNIZATION: ICD-10-CM

## 2020-12-28 PROCEDURE — 91301 SARS-COV-2 / COVID-19 MRNA VACCINE (MODERNA) 100 MCG: CPT

## 2020-12-28 PROCEDURE — 0011A SARS-COV-2 / COVID-19 MRNA VACCINE (MODERNA) 100 MCG: CPT

## 2021-01-14 ENCOUNTER — TELEPHONE (OUTPATIENT)
Dept: FAMILY MEDICINE CLINIC | Facility: CLINIC | Age: 41
End: 2021-01-14

## 2021-01-26 ENCOUNTER — TELEMEDICINE (OUTPATIENT)
Dept: FAMILY MEDICINE CLINIC | Facility: CLINIC | Age: 41
End: 2021-01-26
Payer: COMMERCIAL

## 2021-01-26 VITALS — WEIGHT: 149 LBS | BODY MASS INDEX: 25.44 KG/M2 | HEIGHT: 64 IN

## 2021-01-26 DIAGNOSIS — B34.9 VIRAL INFECTION, UNSPECIFIED: ICD-10-CM

## 2021-01-26 DIAGNOSIS — Z03.818 ENCOUNTER FOR OBSERVATION FOR SUSPECTED EXPOSURE TO OTHER BIOLOGICAL AGENTS RULED OUT: ICD-10-CM

## 2021-01-26 PROBLEM — F90.9 ADHD: Status: ACTIVE | Noted: 2021-01-26

## 2021-01-26 PROBLEM — F84.0 AUTISTIC SPECTRUM DISORDER: Status: ACTIVE | Noted: 2021-01-26

## 2021-01-26 PROCEDURE — 99213 OFFICE O/P EST LOW 20 MIN: CPT | Performed by: NURSE PRACTITIONER

## 2021-01-26 PROCEDURE — U0003 INFECTIOUS AGENT DETECTION BY NUCLEIC ACID (DNA OR RNA); SEVERE ACUTE RESPIRATORY SYNDROME CORONAVIRUS 2 (SARS-COV-2) (CORONAVIRUS DISEASE [COVID-19]), AMPLIFIED PROBE TECHNIQUE, MAKING USE OF HIGH THROUGHPUT TECHNOLOGIES AS DESCRIBED BY CMS-2020-01-R: HCPCS | Performed by: NURSE PRACTITIONER

## 2021-01-26 PROCEDURE — U0005 INFEC AGEN DETEC AMPLI PROBE: HCPCS | Performed by: NURSE PRACTITIONER

## 2021-01-26 NOTE — PROGRESS NOTES
COVID-19 Virtual Visit     Assessment/Plan:    Problem List Items Addressed This Visit     None      Visit Diagnoses     Encounter for observation for suspected exposure to other biological agents ruled out        Relevant Orders    Novel Coronavirus (Covid-19),PCR [facility] - Collected at [facility] or [facility]    Viral infection, unspecified        Relevant Orders    Novel Coronavirus (Covid-19),PCR [facility] - Collected at [place] [address] or [facility]         Disposition:     I recommended self-quarantine for 10 days and to watch for symptoms until 14 days after exposure  If patient were to develop symptoms, they should self isolate and call our office for further guidance  I referred patient to one of our centralized sites for a COVID-19 swab  I have spent 7 minutes directly with the patient  Advised on self isolation until results are pending  Supportive care for viral illness discussed and advised  will follow up for any worsening and no improvement  Supportive care discussed and advised  Advised to RTO for any worsening and no improvement  Follow up for no improvement and worsening of conditions  Patient advised and educated when to see immediate medical care  Encounter provider DENYS Lafleur    Provider located at [address] [address]    Recent Visits  No visits were found meeting these conditions  Showing recent visits within past 7 days and meeting all other requirements     Today's Visits  Date Type Provider Dept   01/26/21 Telemedicine Armani Lafleur [place] today's visits and meeting all other requirements     Future Appointments  No visits were found meeting these conditions  Showing future appointments within next 150 days and meeting all other requirements      This virtual check-in was done via [employer] and patient was informed that this is not a secure, HIPAA-compliant platform   She agrees to proceed  Patient agrees to participate in a virtual check in via telephone or video visit instead of presenting to the office to address urgent/immediate medical needs  Patient is aware this is a billable service  After connecting through [facility], the patient was identified by name and date of birth  Carmela Butler was informed that this was a telemedicine visit and that the exam was being conducted confidentially over secure lines  My office door was closed  No one else was in the room  Carmela Butler acknowledged consent and understanding of privacy and security of the telemedicine visit  I informed the patient that I have reviewed her record in Epic and presented the opportunity for her to ask any questions regarding the visit today  The patient agreed to participate  Subjective:   Carmela Butler is a 38 y o  female who is concerned about COVID-19  Patient's symptoms include diarrhea  Patient denies fever, chills, fatigue, malaise, congestion, rhinorrhea, sore throat, anosmia, loss of taste, cough, shortness of breath, chest tightness, abdominal pain, nausea, vomiting, myalgias and headaches       Date of symptom onset: 1/24/2021  Date of exposure: 1/16/2021    Exposure:   Contact with a person who is under investigation (PUI) for or who is positive for COVID-19 within the last 14 days?: Yes    Hospitalized recently for fever and/or lower respiratory symptoms?: No      Currently a healthcare worker that is involved in direct patient care?: Yes      Works in a special setting where the risk of COVID-19 transmission may be high? (this may include long-term care, correctional and intermediate facilities; homeless shelters; assisted-living facilities and group homes ): No      Resident in a special setting where the risk of COVID-19 transmission may be high? (this may include long-term care, correctional and intermediate facilities; homeless shelters; assisted-living facilities and group homes ): No Patient was exposed to one of coworkers on 1/16/2021 who is positive for COVID-19  Patient started with diarrhea since 1/24/2021 and denies any other symptoms    No results found for: Roberto Dale  Past Medical History:   Diagnosis Date    Anemia     Bipolar 2 disorder ([address] )     Genital herpes in women     GERD (gastroesophageal reflux disease)     Herpes simplex infection     History of transfusion     x2     Past Surgical History:   Procedure Laterality Date    ABDOMINAL SURGERY      CHOLECYSTECTOMY LAPAROSCOPIC N/A 7/29/2019    Procedure: ATTEMPTED LAPAROSCOPIC CHOLECYSTECTOMY CONVERTED TO OPEN CHOLECYSTECTOMY; LAPARASCOPIC LYSIS OF ADHESIONS, COMMON BILE DUCT EXPLORATION AND CHOLANGIOGRAM;  Surgeon: Carla Sousa MD;  Location: [facility] OR;  Service: General    CYST REMOVAL      ESOPHAGOGASTRODUODENOSCOPY N/A 4/19/2016    Procedure: ESOPHAGOGASTRODUODENOSCOPY (EGD); Surgeon: Fabian Sierra MD;  Location: [facility] GI LAB; Service:     GASTRIC BYPASS      OOPHORECTOMY  2008    unilateral    OVARIAN CYST REMOVAL       No current outpatient medications on file  No current facility-administered medications for this visit  Allergies   Allergen Reactions    Penicillins Anaphylaxis, Hives and Itching    Oxycodone-Acetaminophen Rash, Swelling and Vomiting       Review of Systems   Constitutional: Negative for chills, fatigue and fever  HENT: Negative for congestion, rhinorrhea and sore throat  Respiratory: Negative for cough, chest tightness and shortness of breath  Gastrointestinal: Positive for diarrhea  Negative for abdominal pain, nausea and vomiting  Musculoskeletal: Negative for myalgias  Neurological: Negative for headaches  Objective:    Vitals:    01/26/21 0845   Weight: 67 6 kg (149 lb)   Height: 5' 4" (1 626 m)       Physical Exam  HENT:      Head: Normocephalic        Right Ear: External ear normal       Left Ear: External ear normal       Nose: Nose normal    Eyes:      Conjunctiva/sclera: Conjunctivae normal    Neck:      Musculoskeletal: Normal range of motion  Pulmonary:      Effort: Pulmonary effort is normal       Comments: No cough and distress noted on video call  Skin:     Comments: No diaphoresis noted on video call   Neurological:      Mental Status: She is alert and oriented to person, place, and time  Psychiatric:         Mood and Affect: Mood normal          Behavior: Behavior normal          Thought Content: Thought content normal          Judgment: Judgment normal      Body mass index is 25 58 kg/m²  BMI Counseling: Body mass index is 25 58 kg/m²  The BMI is above normal  Exercise recommendations include exercising 3-5 times per week and strength training exercises  VIRTUAL VISIT DISCLAIMER    Jae Bai acknowledges that she has consented to an online visit or consultation  She understands that the online visit is based solely on information provided by her, and that, in the absence of a face-to-face physical evaluation by the physician, the diagnosis she receives is both limited and provisional in terms of accuracy and completeness  This is not intended to replace a full medical face-to-face evaluation by the physician  Jae Bai understands and accepts these terms

## 2021-01-27 LAB — SARS-COV-2 RNA RESP QL NAA+PROBE: NEGATIVE

## 2021-01-30 ENCOUNTER — TELEPHONE (OUTPATIENT)
Dept: FAMILY MEDICINE CLINIC | Facility: CLINIC | Age: 41
End: 2021-01-30

## 2021-01-30 NOTE — TELEPHONE ENCOUNTER
Patient needs letter stating she can return to work due to negative covid test   She works for [employer] and needs letter for this evening shift

## 2021-02-02 ENCOUNTER — AMB VIDEO VISIT (OUTPATIENT)
Dept: OTHER | Facility: HOSPITAL | Age: 41
End: 2021-02-02
Payer: COMMERCIAL

## 2021-02-02 PROCEDURE — ECARE PR SL URGENT CARE VIRTUAL VISIT: Performed by: FAMILY MEDICINE

## 2021-02-03 NOTE — CARE ANYWHERE EVISITS
Visit Summary for [facility] - Gender: Female - Date of Birth: [id number]  Date: [id number] - Duration: 5 minutes  Patient: [facility]  Provider: Immanuel Marshall    Patient Contact Information  Address  [address]  [facility]  [place]; [place]da  [phone]    Visit Topics  back sprain from shoveling snow [Added By: Self - 2021-02-02]    Sick Slip  Reason [INJURY]  Remarks [address], [address] [place] [phone]____________________________________________________This patient was seen by telemedicine on 2/2/2021  Please excuse her from work on 2/2/2021, 2/3/2021 and   2/4/2021  She may return to work on 2/5/2021  Thank you  Sincerely, Summer Garcia MD/PhD NPI # [id number] ]  Triage Questions   What is your current physical address in the event of a medical emergency? Answer []  Are you allergic to any medications? Answer []  Are you now or could you be pregnant? Answer []  Do you have any immune system compromise or chronic lung   disease? Answer []  Do you have any vulnerable family members in the home (infant, pregnant, cancer, elderly)? Answer []     Conversation Transcripts  [0A][0A] [Notification] You are connected with Immanuel Marshall Family Physician [0A][Notification] Jovanny Hassan is located in [place]  [0A][Notification] Jovanny Hassan has shared health history  Carmen Console  [0A]    Diagnosis  Low back pain    Procedures  Value: [id number] Code: CPT-4 UNLISTED E&M SERVICE    Medications Prescribed    tizanidine      Frequency :   Patient Instructions : Take 1/2-1 tab po tid prn back pain  Refills : 0  Instructions to the Pharmacist : Substitutions allowed      Provider Notes  [0A][0A] Please be sure to share the details of this visit with your pcp[0A]Mode of Communication: video[0A]History: The patient is a pleasant 37 y/o lady who c/o low back pain which started as she was shoveling snow recently  No back surgery   Pain is   described as achy and is slightly relieved by rest and sitting still  There is no tingling or numbness; the pain does not radiate to the buttocks or hips  No bowel or bladder dysfunction  Taking advil  [0A]Worsened by movement: yes[0A]Recent injuries: yes   - shoveling[0A]Pertinent positives include as above[0A]Pertinent negatives include no tingling or numbness, bowel or bladder dysfunction[0A]Past medical history: healthy[0A]PSH: meg[0A]Medications: none[0A]Allergies: nkda[0A]Exam: [0A]Gen:well   appearing [0A]Posture/gait: forward leaning [0A]Tenderness: TTP lower back paraspinal muscles self report[0A]Neurologic: no gross deficits noted [0A]Assessment: Lumbar strain[0A]Plan:[0A]Medications: Ibuprofen for pain; tizanidine prescribed  Topical   analgesics advised (biofreeze, tiger balm, aspercreme, salon pas etc)  Tizanidine is sedating - do not drive or operate any machinery while taking this medication  Avoid alcohol when taking tizanidine [0A]Home care: May apply ice for 20 minutes, 4-5   times a day  May alternate with heat  No heavy lifting greater than 4 pounds No prolonged sitting or car rides  Walking and mild activities as tolerated[0A]Referral or follow up: As needed for worsening or if not better in 5-7 days [0A]Medical decision   making: Conservative management of back strain[0A]If you received a prescription at this visit and you have a question or problem please call [phone] for prescription assistance Please print a copy of this note and send it to your regular doctor, or   take it to your next visit so it may be included in your medical record Please see your primary care provider on an annual basis or more frequently if directed The patient voiced understanding and agrees with the plan [0A][0A]    Electronically signed by:  Summer Triana(NPI [id number])

## 2021-05-05 ENCOUNTER — TELEPHONE (OUTPATIENT)
Dept: FAMILY MEDICINE CLINIC | Facility: CLINIC | Age: 41
End: 2021-05-05

## 2021-05-05 NOTE — TELEPHONE ENCOUNTER
Please let patient know that I do not do evaluation and treat for ADHD and has to follow up with psychiatrist and give Dr Adelina Tillman numbers   DENYS Garcia

## 2021-05-05 NOTE — TELEPHONE ENCOUNTER
----- Message from Odessa Bustillo sent at 5/5/2021  1:26 PM EDT -----  Regarding: FW: Non-Urgent Medical Question  Contact: [phone]  This message was intended for Pili  Harris Danielle MA   ----- Message -----  From: Garry Duane, DO  Sent: 5/5/2021  12:59 PM EDT  To: [facility]  Subject: FW: Non-Urgent Medical Question                  Please let her know I do not do the evaluation or treatment for ADHD but would strongly suggest she make an appointment with a psychiatrist as well as a psychologist in her insurance plan   ----- Message -----  From: Harris Danielle MA  Sent: 5/5/2021   8:33 AM EDT  To: Garry Duane, DO  Subject: FW: Non-Urgent Medical Question                  Does patient need appt  Harris Danielle MA  ----- Message -----  From: Connor Naqvi  Sent: 5/4/2021   8:13 PM EDT  To: [facility]  Subject: Non-Urgent Belita Lindsay Dr Easton Bach    I would like to be evaluated for ADHD  I am having a lot of issues handling life  I am at risk of losing my employment and living situation and I need help focusing  Is there anyway to visit with you Virtual? I'm having trouble being social with my anxiety lately also  Thank you for your time     Connor Naqvi  [phone]  [email or web address]

## 2021-05-12 DIAGNOSIS — F41.9 ANXIETY: ICD-10-CM

## 2021-05-12 DIAGNOSIS — F90.9 ATTENTION DEFICIT HYPERACTIVITY DISORDER (ADHD), UNSPECIFIED ADHD TYPE: Primary | ICD-10-CM

## 2021-05-17 ENCOUNTER — IMMUNIZATIONS (OUTPATIENT)
Dept: FAMILY MEDICINE CLINIC | Facility: HOSPITAL | Age: 41
End: 2021-05-17

## 2021-05-17 DIAGNOSIS — Z23 ENCOUNTER FOR IMMUNIZATION: Primary | ICD-10-CM

## 2021-05-17 PROCEDURE — 0012A SARS-COV-2 / COVID-19 MRNA VACCINE (MODERNA) 100 MCG: CPT

## 2021-05-17 PROCEDURE — 91301 SARS-COV-2 / COVID-19 MRNA VACCINE (MODERNA) 100 MCG: CPT

## 2022-09-20 ENCOUNTER — OFFICE VISIT (OUTPATIENT)
Dept: FAMILY MEDICINE CLINIC | Facility: CLINIC | Age: 42
End: 2022-09-20
Payer: COMMERCIAL

## 2022-09-20 VITALS
HEART RATE: 103 BPM | HEIGHT: 64 IN | RESPIRATION RATE: 18 BRPM | BODY MASS INDEX: 23.46 KG/M2 | TEMPERATURE: 98 F | SYSTOLIC BLOOD PRESSURE: 100 MMHG | WEIGHT: 137.4 LBS | DIASTOLIC BLOOD PRESSURE: 70 MMHG | OXYGEN SATURATION: 99 %

## 2022-09-20 DIAGNOSIS — F90.9 ATTENTION DEFICIT HYPERACTIVITY DISORDER (ADHD), UNSPECIFIED ADHD TYPE: ICD-10-CM

## 2022-09-20 DIAGNOSIS — F41.1 GAD (GENERALIZED ANXIETY DISORDER): ICD-10-CM

## 2022-09-20 DIAGNOSIS — F84.0 AUTISTIC SPECTRUM DISORDER: ICD-10-CM

## 2022-09-20 DIAGNOSIS — F32.1 MODERATE MAJOR DEPRESSION (HCC): Primary | ICD-10-CM

## 2022-09-20 DIAGNOSIS — H00.011 HORDEOLUM EXTERNUM OF RIGHT UPPER EYELID: ICD-10-CM

## 2022-09-20 DIAGNOSIS — Q99.2 FRAGILE X SYNDROME: ICD-10-CM

## 2022-09-20 PROCEDURE — 99214 OFFICE O/P EST MOD 30 MIN: CPT | Performed by: FAMILY MEDICINE

## 2022-09-20 PROCEDURE — 3725F SCREEN DEPRESSION PERFORMED: CPT | Performed by: FAMILY MEDICINE

## 2022-09-20 RX ORDER — OMEPRAZOLE 20 MG/1
20 CAPSULE, DELAYED RELEASE ORAL 2 TIMES DAILY
COMMUNITY

## 2022-09-20 RX ORDER — ERYTHROMYCIN 5 MG/G
0.5 OINTMENT OPHTHALMIC EVERY 8 HOURS SCHEDULED
Qty: 3.5 G | Refills: 0 | Status: SHIPPED | OUTPATIENT
Start: 2022-09-20

## 2022-09-20 RX ORDER — HYDROXYZINE HYDROCHLORIDE 25 MG/1
25 TABLET, FILM COATED ORAL EVERY 6 HOURS PRN
Qty: 30 TABLET | Refills: 0 | Status: SHIPPED | OUTPATIENT
Start: 2022-09-20

## 2022-09-20 RX ORDER — ESCITALOPRAM OXALATE 10 MG/1
10 TABLET ORAL DAILY
Qty: 30 TABLET | Refills: 1 | Status: SHIPPED | OUTPATIENT
Start: 2022-09-20

## 2022-09-20 NOTE — PROGRESS NOTES
Name: Steve Lopez      : 1980      MRN: [id number]  Encounter Provider: Lizzie Muller MD  Encounter Date: 2022   Encounter department: [address] [place] [address]     1  Moderate major depression ([facility] [address] )  Comments: Will start lexapro today  Follow up in 1 month if unable to get appointment with psychiatry  Orders:  -     Ambulatory Referral to [facility]; Future  -     Ambulatory Referral to Psychiatry; Future  -     escitalopram (Lexapro) 10 mg tablet; Take 1 tablet (10 mg total) by mouth daily    2  Attention deficit hyperactivity disorder (ADHD), unspecified ADHD type  -     Ambulatory Referral to [facility]; Future  -     Ambulatory Referral to Psychiatry; Future    3  Autistic spectrum disorder    4  JO ANN (generalized anxiety disorder)  Comments: Will start lexapro today and prn atarax  Follow up in 1 month if unable to get appointment with psychiatry  Orders:  -     Ambulatory Referral to [facility]; Future  -     Ambulatory Referral to Psychiatry; Future  -     escitalopram (Lexapro) 10 mg tablet; Take 1 tablet (10 mg total) by mouth daily  -     hydrOXYzine HCL (ATARAX) 25 mg tablet; Take 1 tablet (25 mg total) by mouth every 6 (six) hours as needed for anxiety    5  Fragile X syndrome    6  Hordeolum externum of right upper eyelid  Comments:  Counseled on warm compresses to the area  Orders:  -     erythromycin (ILOTYCIN) ophthalmic ointment; Administer 0 5 inches to the right eye every 8 (eight) hours         Subjective      HPI   She reports history of depression, anxiety, ADHD for many years  She was on treatment including SSRI and Wellbutrin in   Since then has not been on any medications or seen a therapist    Symptoms recently worsened  JO ANN-7 Flowsheet Screening    Flowsheet Row Most Recent Value   Over the last 2 weeks, how often have you been bothered by any of the following problems?     Feeling nervous, anxious, or on edge 3 Not being able to stop or control worrying 3   Worrying too much about different things 3   Trouble relaxing 3   Being so restless that it is hard to sit still 3   Becoming easily annoyed or irritable 3   Feeling afraid as if something awful might happen 3   JO ANN-7 Total Score 21        PHQ-2/9 Depression Screening    Little interest or pleasure in doing things: 3 - nearly every day  Feeling down, depressed, or hopeless: 0 - not at all  Trouble falling or staying asleep, or sleeping too much: 3 - nearly every day  Feeling tired or having little energy: 1 - several days  Poor appetite or overeatin - several days  Feeling bad about yourself - or that you are a failure or have let yourself or your family down: 0 - not at all  Trouble concentrating on things, such as reading the newspaper or watching television: 3 - nearly every day  Moving or speaking so slowly that other people could have noticed  Or the opposite - being so fidgety or restless that you have been moving around a lot more than usual: 2 - more than half the days  Thoughts that you would be better off dead, or of hurting yourself in some way: 0 - not at all  PHQ-2 Score: 3  PHQ-2 Interpretation: POSITIVE depression screen  PHQ-9 Score: 13   PHQ-9 Interpretation: Moderate depression        Review of Systems   Constitutional: Negative  HENT: Negative  Eyes: Negative  Respiratory: Negative  Cardiovascular: Negative  Gastrointestinal: Negative  Endocrine: Negative  Genitourinary: Negative  Musculoskeletal: Negative  Skin: Negative  Allergic/Immunologic: Negative  Neurological: Negative  Hematological: Negative  Psychiatric/Behavioral: Positive for dysphoric mood  The patient is nervous/anxious          Current Outpatient Medications on File Prior to Visit   Medication Sig    omeprazole (PriLOSEC) 20 mg delayed release capsule Take 20 mg by mouth 2 (two) times a day       Objective     /70   Pulse 103   Temp 98 °F (36 7 °C)   Resp 18   Ht 5' 4" (1 626 m)   Wt 62 3 kg (137 lb 6 4 oz)   SpO2 99%   BMI 23 58 kg/m²     Physical Exam  Constitutional:       General: She is not in acute distress  Appearance: She is well-developed  She is not diaphoretic  HENT:      Head: Normocephalic and atraumatic  Eyes:      General: No scleral icterus  Right eye: No discharge  Left eye: No discharge  Conjunctiva/sclera: Conjunctivae normal    Pulmonary:      Effort: Pulmonary effort is normal    Musculoskeletal:      Cervical back: Normal range of motion  Skin:     General: Skin is warm  Neurological:      Mental Status: She is alert and oriented to person, place, and time  Psychiatric:         Behavior: Behavior normal          Thought Content:  Thought content normal          Judgment: Judgment normal        Angélica Saldaña MD

## 2022-09-21 ENCOUNTER — TELEPHONE (OUTPATIENT)
Dept: PSYCHIATRY | Facility: CLINIC | Age: 42
End: 2022-09-21

## 2022-09-21 NOTE — TELEPHONE ENCOUNTER
Called patient and lvm requesting a return call to the intake dept regarding a routine psychiatry and therapy referral on file   Need to establish if patient is still interested and if they would like med mgmt or therapy services as well as if they would like to be added to the waitlist

## 2022-09-22 ENCOUNTER — TELEPHONE (OUTPATIENT)
Dept: PSYCHIATRY | Facility: CLINIC | Age: 42
End: 2022-09-22

## 2022-09-27 ENCOUNTER — TELEPHONE (OUTPATIENT)
Dept: PSYCHIATRY | Facility: CLINIC | Age: 42
End: 2022-09-27

## 2022-10-05 ENCOUNTER — TELEPHONE (OUTPATIENT)
Dept: PSYCHIATRY | Facility: CLINIC | Age: 42
End: 2022-10-05

## 2022-10-05 NOTE — TELEPHONE ENCOUNTER
left a VM for pt to return call back to intake, reached out to pt in regards to scheduling services in [facility]

## 2022-10-28 ENCOUNTER — TELEPHONE (OUTPATIENT)
Dept: PSYCHIATRY | Facility: CLINIC | Age: 42
End: 2022-10-28

## 2022-10-28 NOTE — TELEPHONE ENCOUNTER
Spoke to pt, does not want services removed the [facility] waitlist at this time

## 2023-05-18 NOTE — ED PROVIDER NOTES
Can this be given. Please advise. Thanks    History  Chief Complaint   Patient presents with    Fever - 75 years or older     this am @3736 104 1       History provided by:  Patient  Flu Symptoms   Presenting symptoms: cough, fatigue, fever and myalgias    Presenting symptoms: no diarrhea, no headaches, no nausea, no rhinorrhea, no shortness of breath and no sore throat    Severity:  Moderate  Onset quality:  Gradual  Duration:  1 day  Progression:  Worsening  Chronicity:  New  Relieved by:  Nothing  Worsened by:  Nothing  Ineffective treatments:  None tried  Associated symptoms: nasal congestion    Associated symptoms: no chills and no neck stiffness        Prior to Admission Medications   Prescriptions Last Dose Informant Patient Reported? Taking?   acetaminophen (TYLENOL) 325 mg tablet 2/20/2018 at Unknown time  No Yes   Sig: Take 2 tablets (650 mg total) by mouth every 6 (six) hours as needed for mild pain  Facility-Administered Medications: None       Past Medical History:   Diagnosis Date    Anemia        Past Surgical History:   Procedure Laterality Date    ABDOMINAL SURGERY      CYST REMOVAL      ESOPHAGOGASTRODUODENOSCOPY N/A 4/19/2016    Procedure: ESOPHAGOGASTRODUODENOSCOPY (EGD); Surgeon: Nathaly Arreguin MD;  Location: [facility]; Service:     GASTRIC BYPASS      OVARIAN CYST REMOVAL         History reviewed  No pertinent family history  I have reviewed and agree with the history as documented  Social History   Substance Use Topics    Smoking status: Never Smoker    Smokeless tobacco: Never Used    Alcohol use No        Review of Systems   Constitutional: Positive for fatigue and fever  Negative for chills  HENT: Positive for congestion  Negative for rhinorrhea, sore throat and trouble swallowing  Eyes: Negative for pain  Respiratory: Positive for cough  Negative for shortness of breath, wheezing and stridor  Cardiovascular: Negative for chest pain and leg swelling     Gastrointestinal: Negative for abdominal pain, diarrhea and nausea  Endocrine: Negative for polyuria  Genitourinary: Negative for dysuria, flank pain and urgency  Musculoskeletal: Positive for myalgias  Negative for joint swelling and neck stiffness  Skin: Negative for rash  Allergic/Immunologic: Negative for immunocompromised state  Neurological: Negative for dizziness, syncope, weakness, numbness and headaches  Psychiatric/Behavioral: Negative for confusion and suicidal ideas  All other systems reviewed and are negative  Physical Exam  ED Triage Vitals [02/20/18 2057]   Temperature Pulse Respirations Blood Pressure SpO2   99 1 °F (37 3 °C) (!) 112 20 108/65 98 %      Temp Source Heart Rate Source Patient Position - Orthostatic VS BP Location FiO2 (%)   Tympanic Monitor Sitting Right arm --      Pain Score       3           Orthostatic Vital Signs  Vitals:    02/20/18 2057   BP: 108/65   Pulse: (!) 112   Patient Position - Orthostatic VS: Sitting       Physical Exam   Constitutional: She is oriented to person, place, and time  She appears well-developed and well-nourished  HENT:   Head: Normocephalic and atraumatic  Eyes: EOM are normal  Pupils are equal, round, and reactive to light  Neck: Normal range of motion  Neck supple  Cardiovascular: Normal rate and regular rhythm  Exam reveals no friction rub  No murmur heard  Pulmonary/Chest: Breath sounds normal  No respiratory distress  She has no wheezes  She has no rales  Abdominal: Soft  Bowel sounds are normal  She exhibits no distension  There is no tenderness  Musculoskeletal: Normal range of motion  She exhibits no edema or tenderness  Neurological: She is alert and oriented to person, place, and time  Skin: Skin is warm  No rash noted  Psychiatric: She has a normal mood and affect  Nursing note and vitals reviewed        ED Medications  Medications   oseltamivir (TAMIFLU) capsule 75 mg (75 mg Oral Given 2/20/18 2123)   dextromethorphan-guaiFENesin (ROBITUSSIN DM)  mg/5 mL oral syrup 10 mL (10 mL Oral Given 2/20/18 2122)       Diagnostic Studies  Results Reviewed     Procedure Component Value Units Date/Time    Influenza A/B and RSV by PCR (indicated for patients >2 mo of age) [[id number]] Collected:  02/20/18 2122    Lab Status: In process Specimen:  Nasopharyngeal from Nasopharyngeal Swab Updated:  02/20/18 2125                 No orders to display              Procedures  Procedures       Phone Contacts  ED Phone Contact    ED Course  ED Course                                MDM  Number of Diagnoses or Management Options  Flu: new and requires workup  Diagnosis management comments: 48-year-old female presents emergency department with flu-like symptoms for the past 4 hours  The patient states with noted runny nose sore throat as well as intermittent cough myalgias and arthralgias  Possible influenza versus viral illness  Patient did have a flu shot this year treatment with Tamiflu given fact that the symptoms are within the 1st 72 hours  Given strict instructions when to return back to the emergency department recommend supportive care as well  Pt re-examined and evaluated after testing and treatment  Spoke with the patient and feeling improved and sxs have resolved  Will discharge home with close f/u with pcp and instructed to return to the ED if sxs worsen or continue  Pt agrees with the plan for discharge and feels comfortable to go home with proper f/u  Advised to return for worsening or additional problems  Diagnostic tests were reviewed and questions answered  Diagnosis, care plan and treatment options were discussed  The patient understand instructions and will follow up as directed        CritCare Time    Disposition  Final diagnoses:   Flu     Time reflects when diagnosis was documented in both MDM as applicable and the Disposition within this note     Time User Action Codes Description Comment    2/20/2018  9:16 PM Dylon Bishop [J11 1] Flu       ED Disposition     ED Disposition Condition Comment    Discharge  Nani Sterling discharge to home/self care  Condition at discharge: Stable        Follow-up Information     Follow up With Specialties Details Why [address],  Family Medicine Call in 3 days As needed Franco Ryan  [phone]          Discharge Medication List as of 2/20/2018  9:17 PM      START taking these medications    Details   dextromethorphan-guaiFENesin (ROBITUSSIN DM)  mg/5 mL syrup Take 10 mL by mouth 4 (four) times a day as needed for cough, Starting Tue 2/20/2018, Print      oseltamivir (TAMIFLU) 75 mg capsule Take 1 capsule (75 mg total) by mouth 2 (two) times a day for 5 days, Starting Tue 2/20/2018, Until Sun 2/25/2018, Print         CONTINUE these medications which have NOT CHANGED    Details   acetaminophen (TYLENOL) 325 mg tablet Take 2 tablets (650 mg total) by mouth every 6 (six) hours as needed for mild pain  , Starting 4/20/2016, Until Discontinued, No Print           No discharge procedures on file      ED Provider  Electronically Signed by           [address] Montana DO  02/20/18 3593

## (undated) DEVICE — 3M™ TEGADERM™ TRANSPARENT FILM DRESSING FRAME STYLE, 1626W, 4 IN X 4-3/4 IN (10 CM X 12 CM), 50/CT 4CT/CASE: Brand: 3M™ TEGADERM™

## (undated) DEVICE — 3M™ TEGADERM™ TRANSPARENT FILM DRESSING FRAME STYLE, 1624W, 2-3/8 IN X 2-3/4 IN (6 CM X 7 CM), 100/CT 4CT/CASE: Brand: 3M™ TEGADERM™

## (undated) DEVICE — ANTI-FOG SOLUTION WITH FOAM PAD: Brand: DEVON

## (undated) DEVICE — ASTOUND STANDARD SURGICAL GOWN, XL: Brand: CONVERTORS

## (undated) DEVICE — PACK GENERAL LF

## (undated) DEVICE — ENDO CLIP II 10MM PISTOL GRIP SINGLE USE CLIP APPLIER

## (undated) DEVICE — INTENDED FOR TISSUE SEPARATION, AND OTHER PROCEDURES THAT REQUIRE A SHARP SURGICAL BLADE TO PUNCTURE OR CUT.: Brand: BARD-PARKER SAFETY BLADES SIZE 15, STERILE

## (undated) DEVICE — TAUT CATH INTRODUCER 5 FR

## (undated) DEVICE — SHEARS MONOPOL MINI 5MM X 31CM RATCHET HNDL

## (undated) DEVICE — TROCAR: Brand: KII SLEEVE

## (undated) DEVICE — 3M™ STERI-STRIP™ REINFORCED ADHESIVE SKIN CLOSURES, R1547, 1/2 IN X 4 IN (12 MM X 100 MM), 6 STRIPS/ENVELOPE: Brand: 3M™ STERI-STRIP™

## (undated) DEVICE — TROCARS: Brand: KII® BALLOON BLUNT TIP SYSTEM

## (undated) DEVICE — TROCAR: Brand: KII FIOS FIRST ENTRY

## (undated) DEVICE — LIGACLIP MCA MULTIPLE CLIP APPLIERS, 20 MEDIUM CLIPS: Brand: LIGACLIP

## (undated) DEVICE — SCD SEQUENTIAL COMPRESSION COMFORT SLEEVE MEDIUM KNEE LENGTH: Brand: KENDALL SCD

## (undated) DEVICE — CHLORAPREP HI-LITE 26ML ORANGE

## (undated) DEVICE — TISSUE RETRIEVAL SYSTEM: Brand: INZII RETRIEVAL SYSTEM

## (undated) DEVICE — SUT VICRYL PLUS 0 CT-3 27 IN VCP329H

## (undated) DEVICE — SPONGE GAUZE 4 X 9

## (undated) DEVICE — LATEX FREE 10 FT  INSUFFLATION TUBING, COLDER CONNECTOR WITH 1 MICRON FILTER STERILE ONE TIME USE, 20 U: Brand: SURGICAL DIRECT

## (undated) DEVICE — SUT MONOCRYL 4-0 PC-5 18 IN Y823G

## (undated) DEVICE — BASIC DOUBLE BASIN 2-LF: Brand: MEDLINE INDUSTRIES, INC.

## (undated) DEVICE — GLOVE INDICATOR PI UNDERGLOVE SZ 7.5 BLUE

## (undated) DEVICE — DRAPE LAPAROTOMY W/POUCHES

## (undated) DEVICE — GLOVE SRG BIOGEL 7

## (undated) DEVICE — DRESSING MEPILEX AG BORDER 4 X 8 IN

## (undated) DEVICE — IRRIG ENDO FLO TUBING